# Patient Record
Sex: MALE | Race: WHITE | NOT HISPANIC OR LATINO | URBAN - NONMETROPOLITAN AREA
[De-identification: names, ages, dates, MRNs, and addresses within clinical notes are randomized per-mention and may not be internally consistent; named-entity substitution may affect disease eponyms.]

---

## 2019-12-16 ENCOUNTER — APPOINTMENT (OUTPATIENT)
Dept: GENERAL RADIOLOGY | Facility: HOSPITAL | Age: 69
End: 2019-12-16

## 2019-12-16 ENCOUNTER — HOSPITAL ENCOUNTER (INPATIENT)
Facility: HOSPITAL | Age: 69
LOS: 3 days | Discharge: HOME | End: 2019-12-19
Attending: SPECIALIST | Admitting: SPECIALIST

## 2019-12-16 DIAGNOSIS — K80.12 CALCULUS OF GALLBLADDER WITH ACUTE ON CHRONIC CHOLECYSTITIS WITHOUT OBSTRUCTION: Primary | ICD-10-CM

## 2019-12-16 DIAGNOSIS — K81.9 CHOLECYSTITIS: ICD-10-CM

## 2019-12-16 PROBLEM — K80.10 CHOLECYSTITIS WITH CHOLELITHIASIS: Status: ACTIVE | Noted: 2019-12-16

## 2019-12-16 LAB
CREAT BLD-MCNC: 1.14 MG/DL (ref 0.76–1.27)
GFR SERPL CREATININE-BSD FRML MDRD: 64 ML/MIN/1.73

## 2019-12-16 PROCEDURE — 82150 ASSAY OF AMYLASE: CPT | Performed by: SPECIALIST

## 2019-12-16 PROCEDURE — 71045 X-RAY EXAM CHEST 1 VIEW: CPT

## 2019-12-16 PROCEDURE — 82565 ASSAY OF CREATININE: CPT | Performed by: SPECIALIST

## 2019-12-16 PROCEDURE — 83690 ASSAY OF LIPASE: CPT | Performed by: SPECIALIST

## 2019-12-16 PROCEDURE — 25010000002 PIPERACILLIN SOD-TAZOBACTAM PER 1 G: Performed by: SPECIALIST

## 2019-12-16 RX ORDER — CARVEDILOL 25 MG/1
25 TABLET ORAL 2 TIMES DAILY WITH MEALS
Status: DISCONTINUED | OUTPATIENT
Start: 2019-12-16 | End: 2019-12-19 | Stop reason: HOSPADM

## 2019-12-16 RX ORDER — HYDROCODONE BITARTRATE AND ACETAMINOPHEN 7.5; 325 MG/1; MG/1
1 TABLET ORAL 2 TIMES DAILY
COMMUNITY

## 2019-12-16 RX ORDER — OXYCODONE AND ACETAMINOPHEN 10; 325 MG/1; MG/1
1 TABLET ORAL EVERY 4 HOURS PRN
Status: DISCONTINUED | OUTPATIENT
Start: 2019-12-16 | End: 2019-12-19 | Stop reason: HOSPADM

## 2019-12-16 RX ORDER — LISINOPRIL 10 MG/1
10 TABLET ORAL DAILY
COMMUNITY

## 2019-12-16 RX ORDER — CHOLECALCIFEROL (VITAMIN D3) 125 MCG
10 CAPSULE ORAL NIGHTLY PRN
COMMUNITY

## 2019-12-16 RX ORDER — HYDROCODONE BITARTRATE AND ACETAMINOPHEN 7.5; 325 MG/1; MG/1
1 TABLET ORAL EVERY 4 HOURS PRN
Status: DISCONTINUED | OUTPATIENT
Start: 2019-12-16 | End: 2019-12-19 | Stop reason: HOSPADM

## 2019-12-16 RX ORDER — MORPHINE SULFATE 2 MG/ML
2 INJECTION, SOLUTION INTRAMUSCULAR; INTRAVENOUS
Status: DISCONTINUED | OUTPATIENT
Start: 2019-12-16 | End: 2019-12-19 | Stop reason: HOSPADM

## 2019-12-16 RX ORDER — DIPHENHYDRAMINE HCL 50 MG
50 CAPSULE ORAL NIGHTLY PRN
COMMUNITY

## 2019-12-16 RX ORDER — PANTOPRAZOLE SODIUM 20 MG/1
20 TABLET, DELAYED RELEASE ORAL
COMMUNITY

## 2019-12-16 RX ORDER — FUROSEMIDE 20 MG/1
20 TABLET ORAL 2 TIMES DAILY
COMMUNITY

## 2019-12-16 RX ORDER — SODIUM CHLORIDE 0.9 % (FLUSH) 0.9 %
10 SYRINGE (ML) INJECTION EVERY 12 HOURS SCHEDULED
Status: DISCONTINUED | OUTPATIENT
Start: 2019-12-16 | End: 2019-12-19 | Stop reason: HOSPADM

## 2019-12-16 RX ORDER — LANOLIN ALCOHOL/MO/W.PET/CERES
1000 CREAM (GRAM) TOPICAL DAILY
COMMUNITY

## 2019-12-16 RX ORDER — NALOXONE HCL 0.4 MG/ML
0.4 VIAL (ML) INJECTION
Status: DISCONTINUED | OUTPATIENT
Start: 2019-12-16 | End: 2019-12-19 | Stop reason: HOSPADM

## 2019-12-16 RX ORDER — DEXTROSE, SODIUM CHLORIDE, AND POTASSIUM CHLORIDE 5; .45; .15 G/100ML; G/100ML; G/100ML
75 INJECTION INTRAVENOUS CONTINUOUS
Status: DISCONTINUED | OUTPATIENT
Start: 2019-12-16 | End: 2019-12-18

## 2019-12-16 RX ORDER — ALPRAZOLAM 0.5 MG/1
0.5 TABLET ORAL DAILY PRN
COMMUNITY

## 2019-12-16 RX ORDER — ASPIRIN 81 MG/1
81 TABLET, CHEWABLE ORAL DAILY
COMMUNITY

## 2019-12-16 RX ORDER — FERROUS SULFATE 325(65) MG
325 TABLET ORAL
COMMUNITY

## 2019-12-16 RX ORDER — CARVEDILOL 25 MG/1
25 TABLET ORAL 2 TIMES DAILY WITH MEALS
COMMUNITY

## 2019-12-16 RX ORDER — AMITRIPTYLINE HYDROCHLORIDE 25 MG/1
25 TABLET, FILM COATED ORAL NIGHTLY
Status: DISCONTINUED | OUTPATIENT
Start: 2019-12-16 | End: 2019-12-19 | Stop reason: HOSPADM

## 2019-12-16 RX ORDER — ALPRAZOLAM 0.5 MG/1
0.5 TABLET ORAL DAILY PRN
Status: DISCONTINUED | OUTPATIENT
Start: 2019-12-16 | End: 2019-12-19 | Stop reason: HOSPADM

## 2019-12-16 RX ORDER — LISINOPRIL 10 MG/1
10 TABLET ORAL DAILY
Status: DISCONTINUED | OUTPATIENT
Start: 2019-12-16 | End: 2019-12-19 | Stop reason: HOSPADM

## 2019-12-16 RX ORDER — AMITRIPTYLINE HYDROCHLORIDE 25 MG/1
25 TABLET, FILM COATED ORAL NIGHTLY
COMMUNITY

## 2019-12-16 RX ORDER — MELATONIN
1000 DAILY
COMMUNITY

## 2019-12-16 RX ORDER — ATORVASTATIN CALCIUM 20 MG/1
20 TABLET, FILM COATED ORAL NIGHTLY
COMMUNITY

## 2019-12-16 RX ORDER — ONDANSETRON 2 MG/ML
4 INJECTION INTRAMUSCULAR; INTRAVENOUS EVERY 6 HOURS PRN
Status: DISCONTINUED | OUTPATIENT
Start: 2019-12-16 | End: 2019-12-19 | Stop reason: HOSPADM

## 2019-12-16 RX ORDER — SODIUM CHLORIDE 0.9 % (FLUSH) 0.9 %
10 SYRINGE (ML) INJECTION AS NEEDED
Status: DISCONTINUED | OUTPATIENT
Start: 2019-12-16 | End: 2019-12-19 | Stop reason: HOSPADM

## 2019-12-16 RX ORDER — FAMOTIDINE 10 MG/ML
20 INJECTION, SOLUTION INTRAVENOUS 2 TIMES DAILY
Status: DISCONTINUED | OUTPATIENT
Start: 2019-12-16 | End: 2019-12-19 | Stop reason: HOSPADM

## 2019-12-16 RX ORDER — LANOLIN ALCOHOL/MO/W.PET/CERES
3 CREAM (GRAM) TOPICAL NIGHTLY PRN
Status: DISCONTINUED | OUTPATIENT
Start: 2019-12-16 | End: 2019-12-19 | Stop reason: HOSPADM

## 2019-12-16 RX ADMIN — FAMOTIDINE 20 MG: 10 INJECTION, SOLUTION INTRAVENOUS at 21:10

## 2019-12-16 RX ADMIN — CARVEDILOL 25 MG: 25 TABLET, FILM COATED ORAL at 20:55

## 2019-12-16 RX ADMIN — AMITRIPTYLINE HYDROCHLORIDE 25 MG: 25 TABLET, FILM COATED ORAL at 20:55

## 2019-12-16 RX ADMIN — LISINOPRIL 10 MG: 10 TABLET ORAL at 20:55

## 2019-12-16 RX ADMIN — TAZOBACTAM SODIUM AND PIPERACILLIN SODIUM 3.38 G: 375; 3 INJECTION, SOLUTION INTRAVENOUS at 20:56

## 2019-12-16 RX ADMIN — POTASSIUM CHLORIDE, DEXTROSE MONOHYDRATE AND SODIUM CHLORIDE 75 ML/HR: 150; 5; 450 INJECTION, SOLUTION INTRAVENOUS at 20:56

## 2019-12-17 ENCOUNTER — APPOINTMENT (OUTPATIENT)
Dept: CARDIOLOGY | Facility: HOSPITAL | Age: 69
End: 2019-12-17

## 2019-12-17 ENCOUNTER — ANESTHESIA (OUTPATIENT)
Dept: PERIOP | Facility: HOSPITAL | Age: 69
End: 2019-12-17

## 2019-12-17 ENCOUNTER — ANESTHESIA EVENT (OUTPATIENT)
Dept: PERIOP | Facility: HOSPITAL | Age: 69
End: 2019-12-17

## 2019-12-17 LAB
ALBUMIN SERPL-MCNC: 3.3 G/DL (ref 3.5–5.2)
ALBUMIN/GLOB SERPL: 1 G/DL
ALP SERPL-CCNC: 103 U/L (ref 39–117)
ALT SERPL W P-5'-P-CCNC: 14 U/L (ref 1–41)
AMYLASE SERPL-CCNC: 41 U/L (ref 28–100)
ANION GAP SERPL CALCULATED.3IONS-SCNC: 12 MMOL/L (ref 5–15)
AST SERPL-CCNC: 13 U/L (ref 1–40)
BASOPHILS # BLD AUTO: 0.05 10*3/MM3 (ref 0–0.2)
BASOPHILS NFR BLD AUTO: 0.5 % (ref 0–1.5)
BH CV ECHO MEAS - AO MAX PG (FULL): 4.9 MMHG
BH CV ECHO MEAS - AO MAX PG: 12.5 MMHG
BH CV ECHO MEAS - AO MEAN PG (FULL): 3 MMHG
BH CV ECHO MEAS - AO MEAN PG: 6 MMHG
BH CV ECHO MEAS - AO ROOT AREA (BSA CORRECTED): 1.4
BH CV ECHO MEAS - AO ROOT AREA: 6.2 CM^2
BH CV ECHO MEAS - AO ROOT DIAM: 2.8 CM
BH CV ECHO MEAS - AO V2 MAX: 177 CM/SEC
BH CV ECHO MEAS - AO V2 MEAN: 114 CM/SEC
BH CV ECHO MEAS - AO V2 VTI: 37 CM
BH CV ECHO MEAS - AVA(I,A): 3.6 CM^2
BH CV ECHO MEAS - AVA(I,D): 3.6 CM^2
BH CV ECHO MEAS - AVA(V,A): 3.2 CM^2
BH CV ECHO MEAS - AVA(V,D): 3.2 CM^2
BH CV ECHO MEAS - BSA(HAYCOCK): 2 M^2
BH CV ECHO MEAS - BSA: 2 M^2
BH CV ECHO MEAS - BZI_BMI: 28.3 KILOGRAMS/M^2
BH CV ECHO MEAS - BZI_METRIC_HEIGHT: 172.7 CM
BH CV ECHO MEAS - BZI_METRIC_WEIGHT: 84.4 KG
BH CV ECHO MEAS - EDV(CUBED): 198.2 ML
BH CV ECHO MEAS - EDV(MOD-SP4): 224 ML
BH CV ECHO MEAS - EDV(TEICH): 168.5 ML
BH CV ECHO MEAS - EF(CUBED): 54.9 %
BH CV ECHO MEAS - EF(MOD-SP4): 43.3 %
BH CV ECHO MEAS - EF(TEICH): 46 %
BH CV ECHO MEAS - ESV(CUBED): 89.3 ML
BH CV ECHO MEAS - ESV(MOD-SP4): 127 ML
BH CV ECHO MEAS - ESV(TEICH): 91 ML
BH CV ECHO MEAS - FS: 23.3 %
BH CV ECHO MEAS - IVS/LVPW: 1
BH CV ECHO MEAS - IVSD: 1 CM
BH CV ECHO MEAS - LA DIMENSION: 4.3 CM
BH CV ECHO MEAS - LA/AO: 1.5
BH CV ECHO MEAS - LAT PEAK E' VEL: 7.5 CM/SEC
BH CV ECHO MEAS - LV DIASTOLIC VOL/BSA (35-75): 113 ML/M^2
BH CV ECHO MEAS - LV MASS(C)D: 241.3 GRAMS
BH CV ECHO MEAS - LV MASS(C)DI: 121.7 GRAMS/M^2
BH CV ECHO MEAS - LV MAX PG: 7.6 MMHG
BH CV ECHO MEAS - LV MEAN PG: 3 MMHG
BH CV ECHO MEAS - LV SYSTOLIC VOL/BSA (12-30): 64.1 ML/M^2
BH CV ECHO MEAS - LV V1 MAX: 138 CM/SEC
BH CV ECHO MEAS - LV V1 MEAN: 79.7 CM/SEC
BH CV ECHO MEAS - LV V1 VTI: 31.7 CM
BH CV ECHO MEAS - LVIDD: 5.8 CM
BH CV ECHO MEAS - LVIDS: 4.5 CM
BH CV ECHO MEAS - LVLD AP4: 9.3 CM
BH CV ECHO MEAS - LVLS AP4: 9 CM
BH CV ECHO MEAS - LVOT AREA (M): 4.2 CM^2
BH CV ECHO MEAS - LVOT AREA: 4.2 CM^2
BH CV ECHO MEAS - LVOT DIAM: 2.3 CM
BH CV ECHO MEAS - LVPWD: 1 CM
BH CV ECHO MEAS - MED PEAK E' VEL: 5.11 CM/SEC
BH CV ECHO MEAS - MR MAX PG: 29.4 MMHG
BH CV ECHO MEAS - MR MAX VEL: 271 CM/SEC
BH CV ECHO MEAS - MV A MAX VEL: 124 CM/SEC
BH CV ECHO MEAS - MV DEC TIME: 0.2 SEC
BH CV ECHO MEAS - MV E MAX VEL: 89.3 CM/SEC
BH CV ECHO MEAS - MV E/A: 0.72
BH CV ECHO MEAS - PA MAX PG: 5.6 MMHG
BH CV ECHO MEAS - PA V2 MAX: 118 CM/SEC
BH CV ECHO MEAS - RAP SYSTOLE: 5 MMHG
BH CV ECHO MEAS - RVDD: 4.3 CM
BH CV ECHO MEAS - RVSP: 33.9 MMHG
BH CV ECHO MEAS - SI(AO): 115 ML/M^2
BH CV ECHO MEAS - SI(CUBED): 54.9 ML/M^2
BH CV ECHO MEAS - SI(LVOT): 66.5 ML/M^2
BH CV ECHO MEAS - SI(MOD-SP4): 48.9 ML/M^2
BH CV ECHO MEAS - SI(TEICH): 39.1 ML/M^2
BH CV ECHO MEAS - SV(AO): 227.8 ML
BH CV ECHO MEAS - SV(CUBED): 108.8 ML
BH CV ECHO MEAS - SV(LVOT): 131.7 ML
BH CV ECHO MEAS - SV(MOD-SP4): 97 ML
BH CV ECHO MEAS - SV(TEICH): 77.5 ML
BH CV ECHO MEAS - TR MAX VEL: 269 CM/SEC
BH CV ECHO MEASUREMENTS AVERAGE E/E' RATIO: 14.16
BILIRUB SERPL-MCNC: 0.9 MG/DL (ref 0.2–1.2)
BUN BLD-MCNC: 23 MG/DL (ref 8–23)
BUN/CREAT SERPL: 18.5 (ref 7–25)
CALCIUM SPEC-SCNC: 9 MG/DL (ref 8.6–10.5)
CHLORIDE SERPL-SCNC: 96 MMOL/L (ref 98–107)
CO2 SERPL-SCNC: 31 MMOL/L (ref 22–29)
CREAT BLD-MCNC: 1.24 MG/DL (ref 0.76–1.27)
DEPRECATED RDW RBC AUTO: 39.9 FL (ref 37–54)
EOSINOPHIL # BLD AUTO: 0.17 10*3/MM3 (ref 0–0.4)
EOSINOPHIL NFR BLD AUTO: 1.8 % (ref 0.3–6.2)
ERYTHROCYTE [DISTWIDTH] IN BLOOD BY AUTOMATED COUNT: 11.9 % (ref 12.3–15.4)
GFR SERPL CREATININE-BSD FRML MDRD: 58 ML/MIN/1.73
GLOBULIN UR ELPH-MCNC: 3.3 GM/DL
GLUCOSE BLD-MCNC: 120 MG/DL (ref 65–99)
HCT VFR BLD AUTO: 33.4 % (ref 37.5–51)
HGB BLD-MCNC: 11.3 G/DL (ref 13–17.7)
IMM GRANULOCYTES # BLD AUTO: 0.04 10*3/MM3 (ref 0–0.05)
IMM GRANULOCYTES NFR BLD AUTO: 0.4 % (ref 0–0.5)
LEFT ATRIUM VOLUME INDEX: 41.5 ML/M2
LEFT ATRIUM VOLUME: 82.1 CM3
LIPASE SERPL-CCNC: 49 U/L (ref 13–60)
LV EF 2D ECHO EST: 45 %
LYMPHOCYTES # BLD AUTO: 1.67 10*3/MM3 (ref 0.7–3.1)
LYMPHOCYTES NFR BLD AUTO: 18.1 % (ref 19.6–45.3)
MAXIMAL PREDICTED HEART RATE: 150 BPM
MCH RBC QN AUTO: 30.9 PG (ref 26.6–33)
MCHC RBC AUTO-ENTMCNC: 33.8 G/DL (ref 31.5–35.7)
MCV RBC AUTO: 91.3 FL (ref 79–97)
MONOCYTES # BLD AUTO: 1.02 10*3/MM3 (ref 0.1–0.9)
MONOCYTES NFR BLD AUTO: 11 % (ref 5–12)
NEUTROPHILS # BLD AUTO: 6.29 10*3/MM3 (ref 1.7–7)
NEUTROPHILS NFR BLD AUTO: 68.2 % (ref 42.7–76)
NRBC BLD AUTO-RTO: 0 /100 WBC (ref 0–0.2)
PLATELET # BLD AUTO: 160 10*3/MM3 (ref 140–450)
PMV BLD AUTO: 12.5 FL (ref 6–12)
POTASSIUM BLD-SCNC: 3 MMOL/L (ref 3.5–5.2)
PROT SERPL-MCNC: 6.6 G/DL (ref 6–8.5)
RBC # BLD AUTO: 3.66 10*6/MM3 (ref 4.14–5.8)
SODIUM BLD-SCNC: 139 MMOL/L (ref 136–145)
STRESS TARGET HR: 128 BPM
WBC NRBC COR # BLD: 9.24 10*3/MM3 (ref 3.4–10.8)

## 2019-12-17 PROCEDURE — 25010000002 ONDANSETRON PER 1 MG: Performed by: NURSE ANESTHETIST, CERTIFIED REGISTERED

## 2019-12-17 PROCEDURE — 93306 TTE W/DOPPLER COMPLETE: CPT | Performed by: INTERNAL MEDICINE

## 2019-12-17 PROCEDURE — 25010000002 ONDANSETRON PER 1 MG: Performed by: ANESTHESIOLOGY

## 2019-12-17 PROCEDURE — 25010000002 IOPAMIDOL 61 % SOLUTION: Performed by: SPECIALIST

## 2019-12-17 PROCEDURE — 0399T HC MYOCARDL STRAIN IMAG QUAN ASSMT PER SESS: CPT

## 2019-12-17 PROCEDURE — 25010000003 CEFAZOLIN PER 500 MG: Performed by: NURSE ANESTHETIST, CERTIFIED REGISTERED

## 2019-12-17 PROCEDURE — 25010000002 MORPHINE PER 10 MG: Performed by: SPECIALIST

## 2019-12-17 PROCEDURE — 25010000002 NEOSTIGMINE 10 MG/10ML SOLUTION 10 ML VIAL: Performed by: NURSE ANESTHETIST, CERTIFIED REGISTERED

## 2019-12-17 PROCEDURE — 93306 TTE W/DOPPLER COMPLETE: CPT

## 2019-12-17 PROCEDURE — 99222 1ST HOSP IP/OBS MODERATE 55: CPT | Performed by: INTERNAL MEDICINE

## 2019-12-17 PROCEDURE — 85025 COMPLETE CBC W/AUTO DIFF WBC: CPT | Performed by: SPECIALIST

## 2019-12-17 PROCEDURE — 25010000002 DEXAMETHASONE PER 1 MG: Performed by: NURSE ANESTHETIST, CERTIFIED REGISTERED

## 2019-12-17 PROCEDURE — 0FT44ZZ RESECTION OF GALLBLADDER, PERCUTANEOUS ENDOSCOPIC APPROACH: ICD-10-PCS | Performed by: SPECIALIST

## 2019-12-17 PROCEDURE — 0399T ADULT TRANSTHORACIC ECHO COMPLETE W/ CONT IF NECESSARY PER PROTOCOL: CPT | Performed by: INTERNAL MEDICINE

## 2019-12-17 PROCEDURE — 25010000002 PROPOFOL 10 MG/ML EMULSION: Performed by: NURSE ANESTHETIST, CERTIFIED REGISTERED

## 2019-12-17 PROCEDURE — 25010000002 PERFLUTREN 6.52 MG/ML SUSPENSION: Performed by: SPECIALIST

## 2019-12-17 PROCEDURE — 25010000002 PIPERACILLIN SOD-TAZOBACTAM PER 1 G: Performed by: SPECIALIST

## 2019-12-17 PROCEDURE — 25010000002 FENTANYL CITRATE (PF) 250 MCG/5ML SOLUTION: Performed by: NURSE ANESTHETIST, CERTIFIED REGISTERED

## 2019-12-17 PROCEDURE — 25010000002 FENTANYL CITRATE (PF) 100 MCG/2ML SOLUTION: Performed by: ANESTHESIOLOGY

## 2019-12-17 PROCEDURE — 25010000002 SUCCINYLCHOLINE PER 20 MG: Performed by: NURSE ANESTHETIST, CERTIFIED REGISTERED

## 2019-12-17 PROCEDURE — 80053 COMPREHEN METABOLIC PANEL: CPT | Performed by: SPECIALIST

## 2019-12-17 PROCEDURE — 88304 TISSUE EXAM BY PATHOLOGIST: CPT | Performed by: SPECIALIST

## 2019-12-17 RX ORDER — MIDAZOLAM HYDROCHLORIDE 1 MG/ML
1 INJECTION INTRAMUSCULAR; INTRAVENOUS
Status: DISCONTINUED | OUTPATIENT
Start: 2019-12-17 | End: 2019-12-17 | Stop reason: HOSPADM

## 2019-12-17 RX ORDER — DEXAMETHASONE SODIUM PHOSPHATE 4 MG/ML
INJECTION, SOLUTION INTRA-ARTICULAR; INTRALESIONAL; INTRAMUSCULAR; INTRAVENOUS; SOFT TISSUE AS NEEDED
Status: DISCONTINUED | OUTPATIENT
Start: 2019-12-17 | End: 2019-12-17 | Stop reason: SURG

## 2019-12-17 RX ORDER — GLYCOPYRROLATE 0.2 MG/ML
INJECTION INTRAMUSCULAR; INTRAVENOUS AS NEEDED
Status: DISCONTINUED | OUTPATIENT
Start: 2019-12-17 | End: 2019-12-17 | Stop reason: SURG

## 2019-12-17 RX ORDER — SODIUM CHLORIDE 9 MG/ML
INJECTION, SOLUTION INTRAVENOUS AS NEEDED
Status: DISCONTINUED | OUTPATIENT
Start: 2019-12-17 | End: 2019-12-17 | Stop reason: HOSPADM

## 2019-12-17 RX ORDER — PROPOFOL 10 MG/ML
VIAL (ML) INTRAVENOUS AS NEEDED
Status: DISCONTINUED | OUTPATIENT
Start: 2019-12-17 | End: 2019-12-17 | Stop reason: SURG

## 2019-12-17 RX ORDER — SUCCINYLCHOLINE CHLORIDE 20 MG/ML
INJECTION INTRAMUSCULAR; INTRAVENOUS AS NEEDED
Status: DISCONTINUED | OUTPATIENT
Start: 2019-12-17 | End: 2019-12-17 | Stop reason: SURG

## 2019-12-17 RX ORDER — SODIUM CHLORIDE 0.9 % (FLUSH) 0.9 %
3-10 SYRINGE (ML) INJECTION AS NEEDED
Status: DISCONTINUED | OUTPATIENT
Start: 2019-12-17 | End: 2019-12-17 | Stop reason: HOSPADM

## 2019-12-17 RX ORDER — SODIUM CHLORIDE, SODIUM LACTATE, POTASSIUM CHLORIDE, CALCIUM CHLORIDE 600; 310; 30; 20 MG/100ML; MG/100ML; MG/100ML; MG/100ML
100 INJECTION, SOLUTION INTRAVENOUS CONTINUOUS
Status: DISCONTINUED | OUTPATIENT
Start: 2019-12-17 | End: 2019-12-19 | Stop reason: HOSPADM

## 2019-12-17 RX ORDER — OXYCODONE AND ACETAMINOPHEN 10; 325 MG/1; MG/1
1 TABLET ORAL ONCE AS NEEDED
Status: DISCONTINUED | OUTPATIENT
Start: 2019-12-17 | End: 2019-12-17 | Stop reason: HOSPADM

## 2019-12-17 RX ORDER — MIDAZOLAM HYDROCHLORIDE 1 MG/ML
2 INJECTION INTRAMUSCULAR; INTRAVENOUS
Status: DISCONTINUED | OUTPATIENT
Start: 2019-12-17 | End: 2019-12-17 | Stop reason: HOSPADM

## 2019-12-17 RX ORDER — SODIUM CHLORIDE 0.9 % (FLUSH) 0.9 %
3 SYRINGE (ML) INJECTION EVERY 12 HOURS SCHEDULED
Status: DISCONTINUED | OUTPATIENT
Start: 2019-12-17 | End: 2019-12-17 | Stop reason: HOSPADM

## 2019-12-17 RX ORDER — ONDANSETRON 2 MG/ML
INJECTION INTRAMUSCULAR; INTRAVENOUS AS NEEDED
Status: DISCONTINUED | OUTPATIENT
Start: 2019-12-17 | End: 2019-12-17 | Stop reason: SURG

## 2019-12-17 RX ORDER — FENTANYL CITRATE 50 UG/ML
INJECTION, SOLUTION INTRAMUSCULAR; INTRAVENOUS AS NEEDED
Status: DISCONTINUED | OUTPATIENT
Start: 2019-12-17 | End: 2019-12-17 | Stop reason: SURG

## 2019-12-17 RX ORDER — LABETALOL HYDROCHLORIDE 5 MG/ML
5 INJECTION, SOLUTION INTRAVENOUS
Status: DISCONTINUED | OUTPATIENT
Start: 2019-12-17 | End: 2019-12-17 | Stop reason: HOSPADM

## 2019-12-17 RX ORDER — METOCLOPRAMIDE HYDROCHLORIDE 5 MG/ML
5 INJECTION INTRAMUSCULAR; INTRAVENOUS
Status: DISCONTINUED | OUTPATIENT
Start: 2019-12-17 | End: 2019-12-17 | Stop reason: HOSPADM

## 2019-12-17 RX ORDER — CEFAZOLIN SODIUM 1 G/3ML
INJECTION, POWDER, FOR SOLUTION INTRAMUSCULAR; INTRAVENOUS AS NEEDED
Status: DISCONTINUED | OUTPATIENT
Start: 2019-12-17 | End: 2019-12-17 | Stop reason: SURG

## 2019-12-17 RX ORDER — IPRATROPIUM BROMIDE AND ALBUTEROL SULFATE 2.5; .5 MG/3ML; MG/3ML
3 SOLUTION RESPIRATORY (INHALATION) ONCE AS NEEDED
Status: DISCONTINUED | OUTPATIENT
Start: 2019-12-17 | End: 2019-12-17 | Stop reason: HOSPADM

## 2019-12-17 RX ORDER — NEOSTIGMINE METHYLSULFATE 1 MG/ML
INJECTION, SOLUTION INTRAVENOUS AS NEEDED
Status: DISCONTINUED | OUTPATIENT
Start: 2019-12-17 | End: 2019-12-17 | Stop reason: SURG

## 2019-12-17 RX ORDER — LIDOCAINE HYDROCHLORIDE 20 MG/ML
INJECTION, SOLUTION INFILTRATION; PERINEURAL AS NEEDED
Status: DISCONTINUED | OUTPATIENT
Start: 2019-12-17 | End: 2019-12-17 | Stop reason: SURG

## 2019-12-17 RX ORDER — BUPIVACAINE HYDROCHLORIDE AND EPINEPHRINE 5; 5 MG/ML; UG/ML
INJECTION, SOLUTION PERINEURAL AS NEEDED
Status: DISCONTINUED | OUTPATIENT
Start: 2019-12-17 | End: 2019-12-17 | Stop reason: HOSPADM

## 2019-12-17 RX ORDER — FENTANYL CITRATE 50 UG/ML
25 INJECTION, SOLUTION INTRAMUSCULAR; INTRAVENOUS AS NEEDED
Status: DISCONTINUED | OUTPATIENT
Start: 2019-12-17 | End: 2019-12-17 | Stop reason: HOSPADM

## 2019-12-17 RX ORDER — NALOXONE HCL 0.4 MG/ML
0.4 VIAL (ML) INJECTION AS NEEDED
Status: DISCONTINUED | OUTPATIENT
Start: 2019-12-17 | End: 2019-12-17 | Stop reason: HOSPADM

## 2019-12-17 RX ORDER — PHENYLEPHRINE HCL IN 0.9% NACL 0.8MG/10ML
SYRINGE (ML) INTRAVENOUS AS NEEDED
Status: DISCONTINUED | OUTPATIENT
Start: 2019-12-17 | End: 2019-12-17 | Stop reason: SURG

## 2019-12-17 RX ORDER — MAGNESIUM HYDROXIDE 1200 MG/15ML
LIQUID ORAL AS NEEDED
Status: DISCONTINUED | OUTPATIENT
Start: 2019-12-17 | End: 2019-12-17 | Stop reason: HOSPADM

## 2019-12-17 RX ORDER — OXYCODONE AND ACETAMINOPHEN 7.5; 325 MG/1; MG/1
2 TABLET ORAL EVERY 4 HOURS PRN
Status: DISCONTINUED | OUTPATIENT
Start: 2019-12-17 | End: 2019-12-17 | Stop reason: HOSPADM

## 2019-12-17 RX ORDER — ROCURONIUM BROMIDE 10 MG/ML
INJECTION, SOLUTION INTRAVENOUS AS NEEDED
Status: DISCONTINUED | OUTPATIENT
Start: 2019-12-17 | End: 2019-12-17 | Stop reason: SURG

## 2019-12-17 RX ORDER — ACETAMINOPHEN 500 MG
1000 TABLET ORAL ONCE
Status: DISCONTINUED | OUTPATIENT
Start: 2019-12-17 | End: 2019-12-17 | Stop reason: HOSPADM

## 2019-12-17 RX ORDER — ONDANSETRON 2 MG/ML
4 INJECTION INTRAMUSCULAR; INTRAVENOUS ONCE AS NEEDED
Status: COMPLETED | OUTPATIENT
Start: 2019-12-17 | End: 2019-12-17

## 2019-12-17 RX ADMIN — Medication 80 MCG: at 17:14

## 2019-12-17 RX ADMIN — AMITRIPTYLINE HYDROCHLORIDE 25 MG: 25 TABLET, FILM COATED ORAL at 20:13

## 2019-12-17 RX ADMIN — TAZOBACTAM SODIUM AND PIPERACILLIN SODIUM 3.38 G: 375; 3 INJECTION, SOLUTION INTRAVENOUS at 02:11

## 2019-12-17 RX ADMIN — CEFAZOLIN 2 G: 330 INJECTION, POWDER, FOR SOLUTION INTRAMUSCULAR; INTRAVENOUS at 17:07

## 2019-12-17 RX ADMIN — FENTANYL CITRATE 100 MCG: 50 INJECTION, SOLUTION INTRAMUSCULAR; INTRAVENOUS at 18:30

## 2019-12-17 RX ADMIN — CARVEDILOL 25 MG: 25 TABLET, FILM COATED ORAL at 20:11

## 2019-12-17 RX ADMIN — DEXAMETHASONE SODIUM PHOSPHATE 4 MG: 4 INJECTION, SOLUTION INTRAMUSCULAR; INTRAVENOUS at 17:36

## 2019-12-17 RX ADMIN — FAMOTIDINE 20 MG: 10 INJECTION, SOLUTION INTRAVENOUS at 08:35

## 2019-12-17 RX ADMIN — ONDANSETRON HYDROCHLORIDE 4 MG: 2 SOLUTION INTRAMUSCULAR; INTRAVENOUS at 17:36

## 2019-12-17 RX ADMIN — CARVEDILOL 25 MG: 25 TABLET, FILM COATED ORAL at 08:35

## 2019-12-17 RX ADMIN — SODIUM CHLORIDE, PRESERVATIVE FREE 10 ML: 5 INJECTION INTRAVENOUS at 08:36

## 2019-12-17 RX ADMIN — NEOSTIGMINE METHYLSULFATE 3 MG: 1 INJECTION INTRAVENOUS at 17:52

## 2019-12-17 RX ADMIN — TAZOBACTAM SODIUM AND PIPERACILLIN SODIUM 3.38 G: 375; 3 INJECTION, SOLUTION INTRAVENOUS at 11:00

## 2019-12-17 RX ADMIN — SODIUM CHLORIDE, POTASSIUM CHLORIDE, SODIUM LACTATE AND CALCIUM CHLORIDE 100 ML/HR: 600; 310; 30; 20 INJECTION, SOLUTION INTRAVENOUS at 16:43

## 2019-12-17 RX ADMIN — POTASSIUM CHLORIDE, DEXTROSE MONOHYDRATE AND SODIUM CHLORIDE 75 ML/HR: 150; 5; 450 INJECTION, SOLUTION INTRAVENOUS at 13:53

## 2019-12-17 RX ADMIN — SUCCINYLCHOLINE CHLORIDE 80 MG: 20 INJECTION, SOLUTION INTRAMUSCULAR; INTRAVENOUS at 16:51

## 2019-12-17 RX ADMIN — ROCURONIUM BROMIDE 5 MG: 10 INJECTION INTRAVENOUS at 16:51

## 2019-12-17 RX ADMIN — PERFLUTREN 8.48 MG: 6.52 INJECTION, SUSPENSION INTRAVENOUS at 13:20

## 2019-12-17 RX ADMIN — ROCURONIUM BROMIDE 25 MG: 10 INJECTION INTRAVENOUS at 17:03

## 2019-12-17 RX ADMIN — LISINOPRIL 10 MG: 10 TABLET ORAL at 08:35

## 2019-12-17 RX ADMIN — FAMOTIDINE 20 MG: 10 INJECTION, SOLUTION INTRAVENOUS at 20:32

## 2019-12-17 RX ADMIN — PROPOFOL 150 MG: 10 INJECTION, EMULSION INTRAVENOUS at 16:51

## 2019-12-17 RX ADMIN — SODIUM CHLORIDE, PRESERVATIVE FREE 10 ML: 5 INJECTION INTRAVENOUS at 20:38

## 2019-12-17 RX ADMIN — Medication 80 MCG: at 17:13

## 2019-12-17 RX ADMIN — GLYCOPYRROLATE 0.2 MG: 0.2 INJECTION, SOLUTION INTRAMUSCULAR; INTRAVENOUS at 17:52

## 2019-12-17 RX ADMIN — LIDOCAINE HYDROCHLORIDE 60 MG: 20 INJECTION, SOLUTION INFILTRATION; PERINEURAL at 16:51

## 2019-12-17 RX ADMIN — FENTANYL CITRATE 100 MCG: 50 INJECTION INTRAMUSCULAR; INTRAVENOUS at 16:51

## 2019-12-17 RX ADMIN — ONDANSETRON HYDROCHLORIDE 4 MG: 2 SOLUTION INTRAMUSCULAR; INTRAVENOUS at 18:30

## 2019-12-17 RX ADMIN — MORPHINE SULFATE 4 MG: 4 INJECTION, SOLUTION INTRAMUSCULAR; INTRAVENOUS at 20:31

## 2019-12-17 NOTE — PLAN OF CARE
Patient denies pain/nausea today. Cardiac clearance given and [place] to proceed with [facility]. Consent signed and in chart. IVF and abx cont. Patient has been NPO since MN. Will cont to monitor.      Problem: Pain, Chronic (Adult)  Goal: Identify Related Risk Factors and Signs and Symptoms  Description  Related risk factors and signs and symptoms are identified upon initiation of Human Response Clinical Practice Guideline (CPG).  Outcome: Ongoing (interventions implemented as appropriate)  Flowsheets (Taken 2019 0318 by Yana Dubose RN)  Related Risk Factors (Chronic Pain): disease process;knowledge deficit;prolonged healing;procedures/treatments;pain control inadequate;traumatic injury  Goal: Acceptable Pain/Comfort Level and Functional Ability  Description  Patient will demonstrate the desired outcomes by discharge/transition of care.  Outcome: Ongoing (interventions implemented as appropriate)     Problem: Patient Care Overview  Goal: Plan of Care Review  Outcome: Ongoing (interventions implemented as appropriate)  Flowsheets (Taken 2019 9686)  Progress: improving  Plan of Care Reviewed With: patient; spouse  Goal: Individualization and Mutuality  Outcome: Ongoing (interventions implemented as appropriate)  Goal: Discharge Needs Assessment  Outcome: Ongoing (interventions implemented as appropriate)  Goal: Interprofessional Rounds/Family Conf  Outcome: Ongoing (interventions implemented as appropriate)     Problem: Cholecystectomy (Adult)  Description  Prevent and manage potential problems includin. bleeding  2. decreased bowel motility  3. infection  4. pain  5. postoperative nausea and vomiting  6. postoperative urinary retention  7. respiratory compromise  8. situational response  9. VTE (venous thromboembolism)  10. wound healing impaired  Goal: Signs and Symptoms of Listed Potential Problems Will be Absent, Minimized or Managed (Cholecystectomy)  Description  Signs and symptoms of  listed potential problems will be absent, minimized or managed by discharge/transition of care (reference Cholecystectomy (Adult) CPG).  Outcome: Ongoing (interventions implemented as appropriate)  Goal: Anesthesia/Sedation Recovery  Outcome: Ongoing (interventions implemented as appropriate)

## 2019-12-17 NOTE — PLAN OF CARE
Problem: Pain, Chronic (Adult)  Goal: Identify Related Risk Factors and Signs and Symptoms  Outcome: Ongoing (interventions implemented as appropriate)  Flowsheets (Taken 12/17/2019 0318)  Related Risk Factors (Chronic Pain): disease process; knowledge deficit; prolonged healing; procedures/treatments; pain control inadequate; traumatic injury  Signs and Symptoms (Chronic Pain): verbalization of pain/discomfort for a prolonged time period; verbalization of pain descriptors     Problem: Patient Care Overview  Goal: Plan of Care Review  Outcome: Ongoing (interventions implemented as appropriate)  Flowsheets (Taken 12/17/2019 0318)  Progress: no change  Plan of Care Reviewed With: patient  Note:   Direct admit ;  from [facility]; 10 days of pain r/t gallstones and CT of abdomen;  4 days w/out eating and being pain free;  WBC = 13.5;  Hx of CABG in 2004, CAD, CKD;  medtronic defibrillator;

## 2019-12-17 NOTE — PAYOR COMM NOTE
"12/17/19  [facility] [phone]  [phone]    PATIENT SENT HERE BY ABDULLAHI EVANS.          Renetta Crawford (69 y.o. Male)     Date of Birth Social Security Number Address Home Phone MRN    1950  [address]  [place] [address] [phone] [id number]    Hinduism Marital Status          Other        Admission Date Admission Type Admitting Provider Attending Provider Department, Room/Bed    12/16/19 Urgent Karishma Ariza MD Tyrrell, Dana R, MD [facility] 3C, 378/1    Discharge Date Discharge Disposition Discharge Destination                       Attending Provider:  Karishma Ariza MD    Allergies:  No Known Allergies    Isolation:  None   Infection:  None   Code Status:  CPR    Ht:  172.7 cm (68\")   Wt:  84.4 kg (186 lb)    Admission Cmt:  None   Principal Problem:  None                Active Insurance as of 12/16/2019     Primary Coverage     Payor Plan Insurance Group Employer/Plan Group    [facility] [employer]MIN[id number]      Payor Plan Address Payor Plan Phone Number Payor Plan Fax Number Effective Dates    [address] [phone]  9/1/2014 - None Entered    [facility] [address]       Subscriber Name Subscriber Birth Date Member ID       RENETTA CRAWFORD 1950 [id number]                 Emergency Contacts      (Rel.) Home Phone Work Phone Mobile Phone    TOBIASMICHELJUAN JOSE (Spouse) [phone] -- --    MOO NIHARIKA (Relative) [phone] -- --        Karishma Ariza MD   Physician   Surgery   H&P   Signed   Date of Service:  12/16/19 1946   Creation Time:  12/16/19 1946            Signed        Expand All Collapse All      Show:Clear all  [x]Manual[x]Template[]Copied    Added by:  [x]Karishma Ariza MD    []Crystal for details    Karishma Ariza MD  H&P     Patient Care Team:  Lauryn Goodwin APRN as PCP - General (Nurse Practitioner)     Chief complaint abdominal pain        Subjective         Renetta " Yvette  is a 69 y.o. male transferred from [facility] with abdominal pain.  Patient states on the fifth of this month he had country ham for lunch and had severe abdominal pain develop.  He has had intermittent pain since that time.  Essentially every time he eats now is getting pain that starts at his umbilicus and radiates around his backside.  Is had no nausea no vomiting.  Has had no fevers.  He has had some diarrhea today and yesterday for the first time during this episode.  [facility] and then [facility] which showed gallstones with mild thickening of the gallbladder wall but normal ductal system per [facility].  The physician there also stated that he has cardiomyopathy and history of CHF.  Patient states that he is not aware of those diagnoses.  He has had bypass in 2004 and defibrillator pacemaker with revision a couple years ago.  He also states that he has had a right nephrectomy and other abdominal surgery but is not sure exactly what type.     Review of Systems              Pertinent items are noted in HPI, all other systems reviewed and negative     History  Medical History   No past medical history on file.     Surgical History         Past Surgical History:   Procedure Laterality Date   • CARDIAC CATHETERIZATION       • CARDIAC SURGERY                         No family history on file.  Social History            Tobacco Use   • Smoking status: Never Smoker   • Smokeless tobacco: Never Used   Substance Use Topics   • Alcohol use: Yes       Alcohol/week: 42.0 standard drinks       Types: 42 Cans of beer per week       Comment: 6 PACK A DAY   • Drug use: Not on file      Prescriptions Prior to Admission           Medications Prior to Admission   Medication Sig Dispense Refill Last Dose   • ALPRAZolam (XANAX) 0.5 MG tablet Take 0.5 mg by mouth Daily As Needed for Anxiety.         • amitriptyline (ELAVIL) 25 MG tablet Take 25 mg by mouth Every Night.         • aspirin  81 MG chewable tablet Chew 81 mg Daily.         • atorvastatin (LIPITOR) 20 MG tablet Take 20 mg by mouth Every Night.         • carvedilol (COREG) 25 MG tablet Take 25 mg by mouth 2 (Two) Times a Day With Meals.         • cholecalciferol (VITAMIN D3) 25 MCG (1000 UT) tablet Take 1,000 Units by mouth Daily.         • diphenhydrAMINE (BENADRYL) 50 MG capsule Take 50 mg by mouth At Night As Needed for Itching or Sleep.         • ferrous sulfate 325 (65 FE) MG tablet Take 325 mg by mouth Daily With Breakfast.         • furosemide (LASIX) 20 MG tablet Take 20 mg by mouth 2 (Two) Times a Day. [facility]         • HYDROcodone-acetaminophen (NORCO) 7.5-325 MG per tablet Take 1 tablet by mouth 2 (Two) Times a Day.         • lisinopril (PRINIVIL,ZESTRIL) 10 MG tablet Take 10 mg by mouth Daily.         • melatonin 5 MG tablet tablet Take 10 mg by mouth At Night As Needed.         • pantoprazole (PROTONIX) 20 MG EC tablet Take 20 mg by mouth 2 (Two) Times a Day Before Meals.         • vitamin B-12 (CYANOCOBALAMIN) 1000 MCG tablet Take 1,000 mcg by mouth Daily.               Allergies:  Patient has no known allergies.        Objective         Vital Signs  Temp:  [98.3 °F (36.8 °C)] 98.3 °F (36.8 °C)  Heart Rate:  [55] 55  Resp:  [16] 16  BP: (157)/(94) 157/94     Physical Exam:                 General Appearance:    Alert, cooperative, in no acute distress   Head:    Normocephalic, without obvious abnormality, atraumatic   Eyes:            Lids and lashes normal, conjunctivae and sclerae normal, no   icterus, no pallor, corneas clear, PERRLA   Ears:    Ears appear intact with no abnormalities noted   Neck:   No adenopathy, supple, trachea midline   Back:     No kyphosis present, no scoliosis present, no skin lesions,      erythema or scars, no tenderness to percussion or                   palpation,   range of motion normal   Lungs:     Clear to auscultation,respirations regular, even and                   unlabored    Heart:    Regular rhythm and normal rate, normal S1 and S2, no            murmur, no gallop, no rub, no click   Chest Wall:    No abnormalities observed   Abdomen:    Distended mildly tender right upper quadrant tympanitic in the epigastrium and upper abdomen.  He has a right flank incision as well as a upper midline incision extending below the umbilicus.  He has a small scab in his midline incision that may be related to a suture granuloma   Rectal:     Deferred   Extremities:   Moves all extremities well, no edema, no cyanosis, no             redness   Pulses:   Pulses palpable and equal bilaterally   Skin:   No bleeding, bruising or rash   Lymph nodes:   No palpable adenopathy   Neurologic:   No focal deficits         Results Review:                     Lab Results (last 72 hours)      ** No results found for the last 72 hours. **              Imaging Results (Last 72 Hours)      ** No results found for the last 72 hours. **           Assessment/Plan           * No active hospital problems. *        Inguinal admit and let him have clear liquids tonight make him n.p.o. after midnight.  We will start him on some antibiotics.  We will need cardiac clearance before embarking on any surgery.        Karishma Ariza MD  12/16/19  7:46      Assessment/Plan      Errol Polo MD   Physician   Cardiology   Consults   Signed   Date of Service:  12/17/19 0830   Creation Time:  12/17/19 0830         Consult Orders   Inpatient Cardiology Consult [[id number]] ordered by Karishma Ariza MD at 12/16/19 1955          Signed        Expand All Collapse All      Show:Clear all  [x]Manual[x]Template[]Copied    Added by:  [x]Errol Polo MD    [][facility] for details      LOS: 1 day   Patient Care Team:  Lauryn Goodwin APRN as PCP - General (Nurse Practitioner)     Chief Complaint:   Cholecystitis with cholelithiasis           Subjective     Guy Crawford is a 69 y.o. malewho is being seen in consultation.  Patient has  ischemic cardiomyopathy  Prior history of myocardial infarction  Has undergone ICD implantation with battery replacement approximately 2 years ago  [facility]  Has seen Dr. Mccarthy in the past  Does not have any excessive shortness of breath  No chest pain  No palpitation  No ICD discharge  Presented with abdominal pain  Currently has ongoing abdominal discomfort  Plans for preoperative clearance for abdominal surgery  Orthopnea paroxysmal nocturnal dyspnea  No significant pedal edema  No palpitations     Telemetry: Not on telemetry                  HENT: Negative.    Eyes: Negative.       Respiratory: Negative for cough,   No chest wall soreness,   Shortness of breath,   no wheezing, no stridor.       Cardiovascular: As above     Gastrointestinal: Abdominal pain  Endocrine: Negative.    Genitourinary: Negative for difficulty urinating, dysuria, flank pain and hematuria.      Musculoskeletal: Negative.    Skin: Negative for rash and wound.   Allergic/Immunologic: Negative.       Neurological: Negative for dizziness, syncope, weakness,   No light-headedness  No  headaches.      Hematological: Does not bruise/bleed easily.      Psychiatric/Behavioral: Negative for agitation or behavioral problems,   No confusion,   the patient is  nervous/anxious.        WBC WBC   Date Value Ref Range Status   12/17/2019 9.24 3.40 - 10.80 10*3/mm3 Final       HGB       Hemoglobin   Date Value Ref Range Status   12/17/2019 11.3 (L) 13.0 - 17.7 g/dL Final       HCT       Hematocrit   Date Value Ref Range Status   12/17/2019 33.4 (L) 37.5 - 51.0 % Final       Platelets       Platelets   Date Value Ref Range Status   12/17/2019 160 140 - 450 10*3/mm3 Final       MCV MCV   Date Value Ref Range Status   12/17/2019 91.3 79.0 - 97.0 fL Final             Lab Units 12/17/19  0434 12/16/19 2016   SODIUM mmol/L 139  --    POTASSIUM mmol/L 3.0*  --    CHLORIDE mmol/L 96*  --    CO2 mmol/L 31.0*  --    BUN mg/dL 23  --    CREATININE mg/dL  1.24 1.14   CALCIUM mg/dL 9.0  --    BILIRUBIN mg/dL 0.9  --    ALK PHOS U/L 103  --    ALT (SGPT) U/L 14  --    AST (SGOT) U/L 13  --    GLUCOSE mg/dL 120*  --    No results found for: CKTOTAL, CKMB,   Objective         No Known Allergies     Medication Review: Performed  Current Medications             Current Facility-Administered Medications   Medication Dose Route Frequency Provider Last Rate Last Dose   • ALPRAZolam (XANAX) tablet 0.5 mg  0.5 mg Oral Daily PRN Karishma Ariza MD       • amitriptyline (ELAVIL) tablet 25 mg  25 mg Oral Nightly Karishma Ariza MD   25 mg at 12/16/19 2055   • carvedilol (COREG) tablet 25 mg  25 mg Oral BID With Meals Karishma Ariza MD   25 mg at 12/16/19 2055   • dextrose 5 % and sodium chloride 0.45 % with KCl 20 mEq/L infusion  75 mL/hr Intravenous Continuous Karishma Ariza MD 75 mL/hr at 12/16/19 2056 75 mL/hr at 12/16/19 2056   • famotidine (PEPCID) injection 20 mg  20 mg Intravenous BID Karishma Ariza MD   20 mg at 12/16/19 2110   • HYDROcodone-acetaminophen (NORCO) 7.5-325 MG per tablet 1 tablet  1 tablet Oral Q4H PRN Karishma Ariza MD       • lisinopril (PRINIVIL,ZESTRIL) tablet 10 mg  10 mg Oral Daily Karishma Ariza MD   10 mg at 12/16/19 2055   • melatonin tablet 3 mg  3 mg Oral Nightly PRN Karishma Ariza MD       • morphine injection 4 mg  4 mg Intravenous Q3H PRN Karishma Ariza MD         And   • naloxone (NARCAN) injection 0.4 mg  0.4 mg Intravenous Q5 Min PRN Karishma Ariza MD       • Morphine sulfate (PF) injection 2 mg  2 mg Intravenous Q3H PRN Karishma Ariza MD         And   • naloxone (NARCAN) injection 0.4 mg  0.4 mg Intravenous Q5 Min PRN Karishma Ariza MD       • ondansetron (ZOFRAN) injection 4 mg  4 mg Intravenous Q6H PRN Karishma Ariza MD       • oxyCODONE-acetaminophen (PERCOCET)  MG per tablet 1 tablet  1 tablet Oral Q4H PRN Karishma Ariza MD       • piperacillin-tazobactam (ZOSYN) 3.375 g in iso-osmotic dextrose 50 ml  "(premix)  3.375 g Intravenous Q8H Karishma Ariza MD   3.375 g at 12/17/19 0211   • sodium chloride 0.9 % flush 10 mL  10 mL Intravenous Q12H Karishma Ariza MD       • sodium chloride 0.9 % flush 10 mL  10 mL Intravenous PRN Karishma Ariza MD                Vital Sign Min/Max for last 24 hours  Temp  Min: 97.2 °F (36.2 °C)  Max: 98.3 °F (36.8 °C)   BP  Min: 129/89  Max: 157/94   Pulse  Min: 55  Max: 69   Resp  Min: 16  Max: 16   SpO2  Min: 94 %  Max: 99 %   No data recorded   Weight  Min: 84.4 kg (186 lb)  Max: 84.4 kg (186 lb)          Flowsheet Rows      First Filed Value   Admission Height  172.7 cm (68\") Documented at 12/16/2019 1640   Admission Weight  84.4 kg (186 lb) Documented at 12/16/2019 1640                Physical Exam:     General Appearance: Awake, alert, in no acute distress  Eyes: Pupils equal and reactive    Ears: Appear intact with no abnormalities noted  Nose: Nares normal, no drainage  Neck: supple, trachea midline, no carotid bruit and no JVD  Back: no kyphosis present,    Lungs: respirations regular, respirations even and respirations unlabored     Heart: normal S1, S2,  2/6 pansystolic murmur in the left sternal border,  no rub and no click     Abdomen: normal bowel sounds, no tenderness   Skin: no bleeding, bruising or rash  Extremities: no cyanosis  Psychiatric/Behavioral: Negative for agitation, behavioral problems, confusion, the patient does  appear to be nervous/anxious.                      Results Review:   I reviewed the patient's new clinical results.  I reviewed the patient's new imaging results and agree with the interpretation.  I reviewed the patient's other test results and agree with the interpretation  I personally viewed and interpreted the patient's EKG/Telemetry data  Discussed with patient     Reviewed available prior notes, consults, prior visits, laboratory findings, radiology and cardiology relevant reports.   Updated chart as applicable.   I have reviewed the " patient's medical history in detail and updated the computerized patient record as relevant.       Updated patient regarding any new or relevant abnormalities on review of records or any new findings on physical exam.   Mentioned to patient about purpose of visit and desirable health short and long term goals and objectives.         Assessment/Plan           Cholecystitis with cholelithiasis  Cardiomyopathy  Congestive heart failure  Prior ICD placement        Plan     Place patient on telemetry  Stat echocardiogram  Further recommendations pending results of echocardiogram regarding preoperative cardiac clearance  Check ICD and deactivated for surgery as required     Deep vein thrombosis prophylaxis/precautions  Appropriate diet, fluid, sodium, caffeine, stimulants intake   Questions were encouraged, asked and answered to the patient's  understanding and satisfaction.  Compliance to diet and medications         Errol Polo MD  12/17/19  8:31 AM     EMR Dragon/Transcription was used to dictate part of this note

## 2019-12-17 NOTE — OP NOTE
Karishma Ariza MD Operative Note    Guy Crawford  12/17/2019    Pre-op Diagnosis:   * No pre-op diagnosis entered *    Post-op Diagnosis:     same    Procedure/CPT® Codes:      Procedure(s):  CHOLECYSTECTOMY, LAPAROSCOPIC    Surgeon(s):  Karishma Ariza MD    Anesthesia: General    Staff:   Circulator: Martha Ramírez RN  Scrub Person: Meredith Toledo  Assistant: Nelly Bowles    Estimated Blood Loss: 200ml    Specimens:                Specimens     ID Source Type Tests Collected By Collected At Frozen?      A Gallbladder Tissue · TISSUE PATHOLOGY EXAM   Karishma Ariza MD 12/17/19 1739 No     Description: Gallbladder and Contents            Drains:   Closed/Suction Drain 1 Lateral;Right Abdomen Bulb 15 Fr. (Active)       Indications: Acute cholecystitis.  Cleared from cardiology    Findings: Severe acute cholecystitis with markedly edematous gallbladder wall and Calot's triangle    Complications: none    Procedure: The patient was brought to the operating room and placed in the supine position.  After induction of general anesthesia and infusion of IV antibiotics, the patient was prepped and draped in the usual sterile fashion.  Due to his prior upper midline incision and right flank incision a infraumbilical 5 mm incision was made and the abdomen was insufflated to pressure 15 mmHg.  5 mm port was placed.  The remaining 3 ports were placed under direct vision.  There were some adhesions from his prior upper midline but none from his kidney surgery.  The gallbladder was very distended and very edematous.  When grasped it ruptured and there was pus noted.  It was grasped just superiorly the infundibulum was covered in inflammatory adhesions which were very edematous and somewhat fibrotic.  This was freed.  The cystic duct and the cystic artery were identified isolated and divided tween clips.  I was planning on doing a cholangiogram due to the elevated bilirubin but due to the severe edema and difficulty in the  dissection with a very short cystic duct I felt that I might lose control of the duct and that that would be very difficult to complete laparoscopically so opted to just divide between clips.  The stump did not hold the clips well and I ultimately had to control it below these clips with an Endoloop.  We then dissected the gallbladder off the liver bed with cautery.  It was very raw surface and we controlled as best we could with combination of cautery and Surgicel.  A drain was placed into the liver bed gallbladder fossa and suture placed with 2-0 silk.  The gallbladder is placed into a bag removed the epigastric port.  Irrigation x-rays done.  All ports removed.  The fascia the time of the port sites closed with Vicryl.  Skin was approximate 5 except ago.  Absent Marcaine injected for local anesthesia.  I also excised a little suture granuloma from his midline incision where there was a scab.  This also was closed with Vicryl.  Patient awakened transferred to the cover him in stable condition tolerated the procedure well.  At the end of the procedure all counts were correct.    Karishma Ariza MD     Date: 12/17/2019  Time: 5:59 PM

## 2019-12-17 NOTE — CONSULTS
LOS: 1 day   Patient Care Team:  Lauryn Goodwin APRN as PCP - General (Nurse Practitioner)    Chief Complaint:   Cholecystitis with cholelithiasis         Subjective    Guy Crawford is a 69 y.o. malewho is being seen in consultation.  Patient has ischemic cardiomyopathy  Prior history of myocardial infarction  Has undergone ICD implantation with battery replacement approximately 2 years ago  [facility]  Has seen Dr. Mccarthy in the past  Does not have any excessive shortness of breath  No chest pain  No palpitation  No ICD discharge  Presented with abdominal pain  Currently has ongoing abdominal discomfort  Plans for preoperative clearance for abdominal surgery  Orthopnea paroxysmal nocturnal dyspnea  No significant pedal edema  No palpitations    Telemetry: Not on telemetry    Review of Systems     Constitutional: No chills   Has fatigue   No fever.     HENT: Negative.    Eyes: Negative.      Respiratory: Negative for cough,   No chest wall soreness,   Shortness of breath,   no wheezing, no stridor.      Cardiovascular: As above    Gastrointestinal: Abdominal pain  Endocrine: Negative.    Genitourinary: Negative for difficulty urinating, dysuria, flank pain and hematuria.     Musculoskeletal: Negative.    Skin: Negative for rash and wound.   Allergic/Immunologic: Negative.      Neurological: Negative for dizziness, syncope, weakness,   No light-headedness  No  headaches.     Hematological: Does not bruise/bleed easily.     Psychiatric/Behavioral: Negative for agitation or behavioral problems,   No confusion,   the patient is  nervous/anxious.       History:   History reviewed. No pertinent past medical history.  Past Surgical History:   Procedure Laterality Date   • CARDIAC CATHETERIZATION     • CARDIAC SURGERY     • KIDNEY SURGERY       Social History     Socioeconomic History   • Marital status:      Spouse name: Not on file   • Number of children: Not on file   • Years of education: Not on file    • Highest education level: Not on file   Tobacco Use   • Smoking status: Former Smoker     Packs/day: 1.00     Years: 40.00     Pack years: 40.00     Last attempt to quit: 2011     Years since quittin.0   • Smokeless tobacco: Never Used   Substance and Sexual Activity   • Drug use: Never   • Sexual activity: Defer     History reviewed. No pertinent family history.    Labs:  WBC WBC   Date Value Ref Range Status   2019 9.24 3.40 - 10.80 10*3/mm3 Final      HGB Hemoglobin   Date Value Ref Range Status   2019 11.3 (L) 13.0 - 17.7 g/dL Final      HCT Hematocrit   Date Value Ref Range Status   2019 33.4 (L) 37.5 - 51.0 % Final      Platelets Platelets   Date Value Ref Range Status   2019 160 140 - 450 10*3/mm3 Final      MCV MCV   Date Value Ref Range Status   2019 91.3 79.0 - 97.0 fL Final        Results from last 7 days   Lab Units 19  0434 19   SODIUM mmol/L 139  --    POTASSIUM mmol/L 3.0*  --    CHLORIDE mmol/L 96*  --    CO2 mmol/L 31.0*  --    BUN mg/dL 23  --    CREATININE mg/dL 1.24 1.14   CALCIUM mg/dL 9.0  --    BILIRUBIN mg/dL 0.9  --    ALK PHOS U/L 103  --    ALT (SGPT) U/L 14  --    AST (SGOT) U/L 13  --    GLUCOSE mg/dL 120*  --    No results found for: CKTOTAL, CKMB, CKMBINDEX, TROPONINI, TROPONINT  PT/INR:  No results found for: PROTIME/No results found for: INR    Imaging Results (Last 72 Hours)     Procedure Component Value Units Date/Time    XR Chest 1 View [[id number]] Collected:  19     Updated:  19    Narrative:       XR CHEST 1 VW- 2019 8:11 PM CST     HISTORY: History of congestive failure       COMPARISON: 2014.     FINDINGS:   Lung volumes are decreased. An AICD lead is present in the RIGHT  ventricle. Changes of previous sternotomy are seen. The  cardiomediastinal silhouette and pulmonary vascularity are unchanged.      The osseous structures and surrounding soft tissues demonstrate no  acute  abnormality.       Impression:       1. No radiographic evidence of acute cardiopulmonary process.        This report was finalized on 12/16/2019 20:24 by Dr. Cj Hopper MD.          Objective     No Known Allergies    Medication Review: Performed  Current Facility-Administered Medications   Medication Dose Route Frequency Provider Last Rate Last Dose   • ALPRAZolam (XANAX) tablet 0.5 mg  0.5 mg Oral Daily PRN Karishma Ariza MD       • amitriptyline (ELAVIL) tablet 25 mg  25 mg Oral Nightly Karishma Ariza MD   25 mg at 12/16/19 2055   • carvedilol (COREG) tablet 25 mg  25 mg Oral BID With Meals Karishma Ariza MD   25 mg at 12/16/19 2055   • dextrose 5 % and sodium chloride 0.45 % with KCl 20 mEq/L infusion  75 mL/hr Intravenous Continuous Karishma Ariza MD 75 mL/hr at 12/16/19 2056 75 mL/hr at 12/16/19 2056   • famotidine (PEPCID) injection 20 mg  20 mg Intravenous BID Karishma Ariza MD   20 mg at 12/16/19 2110   • HYDROcodone-acetaminophen (NORCO) 7.5-325 MG per tablet 1 tablet  1 tablet Oral Q4H PRN Karishma Ariza MD       • lisinopril (PRINIVIL,ZESTRIL) tablet 10 mg  10 mg Oral Daily Karishma Ariza MD   10 mg at 12/16/19 2055   • melatonin tablet 3 mg  3 mg Oral Nightly PRN Karishma Ariza MD       • morphine injection 4 mg  4 mg Intravenous Q3H PRN Karishma Ariza MD        And   • naloxone (NARCAN) injection 0.4 mg  0.4 mg Intravenous Q5 Min PRN Karishma Ariza MD       • Morphine sulfate (PF) injection 2 mg  2 mg Intravenous Q3H PRN Karishma Ariza MD        And   • naloxone (NARCAN) injection 0.4 mg  0.4 mg Intravenous Q5 Min PRN Karishma Ariza MD       • ondansetron (ZOFRAN) injection 4 mg  4 mg Intravenous Q6H PRN Karishma Ariza MD       • oxyCODONE-acetaminophen (PERCOCET)  MG per tablet 1 tablet  1 tablet Oral Q4H PRN Karishma Ariza MD       • piperacillin-tazobactam (ZOSYN) 3.375 g in iso-osmotic dextrose 50 ml (premix)  3.375 g Intravenous Q8H Karishma Ariza MD  "  3.375 g at 12/17/19 0211   • sodium chloride 0.9 % flush 10 mL  10 mL Intravenous Q12H Karishma Ariza MD       • sodium chloride 0.9 % flush 10 mL  10 mL Intravenous PRN Karishma Ariza MD           Vital Sign Min/Max for last 24 hours  Temp  Min: 97.2 °F (36.2 °C)  Max: 98.3 °F (36.8 °C)   BP  Min: 129/89  Max: 157/94   Pulse  Min: 55  Max: 69   Resp  Min: 16  Max: 16   SpO2  Min: 94 %  Max: 99 %   No data recorded   Weight  Min: 84.4 kg (186 lb)  Max: 84.4 kg (186 lb)     Flowsheet Rows      First Filed Value   Admission Height  172.7 cm (68\") Documented at 12/16/2019 1640   Admission Weight  84.4 kg (186 lb) Documented at 12/16/2019 1640               Physical Exam:    General Appearance: Awake, alert, in no acute distress  Eyes: Pupils equal and reactive    Ears: Appear intact with no abnormalities noted  Nose: Nares normal, no drainage  Neck: supple, trachea midline, no carotid bruit and no JVD  Back: no kyphosis present,    Lungs: respirations regular, respirations even and respirations unlabored    Heart: normal S1, S2,  2/6 pansystolic murmur in the left sternal border,  no rub and no click    Abdomen: normal bowel sounds, no tenderness   Skin: no bleeding, bruising or rash  Extremities: no cyanosis  Psychiatric/Behavioral: Negative for agitation, behavioral problems, confusion, the patient does  appear to be nervous/anxious.          Results Review:   I reviewed the patient's new clinical results.  I reviewed the patient's new imaging results and agree with the interpretation.  I reviewed the patient's other test results and agree with the interpretation  I personally viewed and interpreted the patient's EKG/Telemetry data  Discussed with patient    Reviewed available prior notes, consults, prior visits, laboratory findings, radiology and cardiology relevant reports.   Updated chart as applicable.   I have reviewed the patient's medical history in detail and updated the computerized patient record as " relevant.      Updated patient regarding any new or relevant abnormalities on review of records or any new findings on physical exam.   Mentioned to patient about purpose of visit and desirable health short and long term goals and objectives.     Assessment/Plan       Cholecystitis with cholelithiasis  Cardiomyopathy  Congestive heart failure  Prior ICD placement      Plan    Place patient on telemetry  Stat echocardiogram  Further recommendations pending results of echocardiogram regarding preoperative cardiac clearance  Check ICD and deactivated for surgery as required    Deep vein thrombosis prophylaxis/precautions  Appropriate diet, fluid, sodium, caffeine, stimulants intake   Questions were encouraged, asked and answered to the patient's  understanding and satisfaction.  Compliance to diet and medications       Errol Polo MD  12/17/19  8:31 AM    EMR Dragon/Transcription was used to dictate part of this note

## 2019-12-17 NOTE — PROGRESS NOTES
Echocardiogram shows ejection fraction 45%  [employer] has checked ICD and satisfactory parameters identified  Recommend using magnet during surgery  Acceptable cardiovascular risk of surgery  Monitor for signs of volume overload  Keep patient on telemetry  Discussed earlier with Dr. Karishma Todd

## 2019-12-17 NOTE — H&P
Karishma Ariza MD  H&P    Patient Care Team:  Lauryn Goodwin APRN as PCP - General (Nurse Practitioner)    Chief complaint abdominal pain    Subjective     Guy Crawford  is a 69 y.o. male transferred from [facility] with abdominal pain.  Patient states on the fifth of this month he had country ham for lunch and had severe abdominal pain develop.  He has had intermittent pain since that time.  Essentially every time he eats now is getting pain that starts at his umbilicus and radiates around his backside.  Is had no nausea no vomiting.  Has had no fevers.  He has had some diarrhea today and yesterday for the first time during this episode.  [facility] and then ultrasound which showed gallstones with mild thickening of the gallbladder wall but normal ductal system per the [facility] physician.  The physician there also stated that he has cardiomyopathy and history of CHF.  Patient states that he is not aware of those diagnoses.  He has had bypass in 2004 and defibrillator pacemaker with revision a couple years ago.  He also states that he has had a right nephrectomy and other abdominal surgery but is not sure exactly what type.    Review of Systems   Pertinent items are noted in HPI, all other systems reviewed and negative    History  No past medical history on file.  Past Surgical History:   Procedure Laterality Date   • CARDIAC CATHETERIZATION     • CARDIAC SURGERY       No family history on file.  Social History     Tobacco Use   • Smoking status: Never Smoker   • Smokeless tobacco: Never Used   Substance Use Topics   • Alcohol use: Yes     Alcohol/week: 42.0 standard drinks     Types: 42 Cans of beer per week     Comment: 6 PACK A DAY   • Drug use: Not on file     Medications Prior to Admission   Medication Sig Dispense Refill Last Dose   • ALPRAZolam (XANAX) 0.5 MG tablet Take 0.5 mg by mouth Daily As Needed for Anxiety.      • amitriptyline (ELAVIL) 25 MG tablet Take 25 mg by mouth Every  Night.      • aspirin 81 MG chewable tablet Chew 81 mg Daily.      • atorvastatin (LIPITOR) 20 MG tablet Take 20 mg by mouth Every Night.      • carvedilol (COREG) 25 MG tablet Take 25 mg by mouth 2 (Two) Times a Day With Meals.      • cholecalciferol (VITAMIN D3) 25 MCG (1000 UT) tablet Take 1,000 Units by mouth Daily.      • diphenhydrAMINE (BENADRYL) 50 MG capsule Take 50 mg by mouth At Night As Needed for Itching or Sleep.      • ferrous sulfate 325 (65 FE) MG tablet Take 325 mg by mouth Daily With Breakfast.      • furosemide (LASIX) 20 MG tablet Take 20 mg by mouth 2 (Two) Times a Day. [facility] fluid [facility]      • HYDROcodone-acetaminophen (NORCO) 7.5-325 MG per tablet Take 1 tablet by mouth 2 (Two) Times a Day.      • lisinopril (PRINIVIL,ZESTRIL) 10 MG tablet Take 10 mg by mouth Daily.      • melatonin 5 MG tablet tablet Take 10 mg by mouth At Night As Needed.      • pantoprazole (PROTONIX) 20 MG EC tablet Take 20 mg by mouth 2 (Two) Times a Day Before Meals.      • vitamin B-12 (CYANOCOBALAMIN) 1000 MCG tablet Take 1,000 mcg by mouth Daily.        Allergies:  Patient has no known allergies.    Objective     Vital Signs  Temp:  [98.3 °F (36.8 °C)] 98.3 °F (36.8 °C)  Heart Rate:  [55] 55  Resp:  [16] 16  BP: (157)/(94) 157/94    Physical Exam:      General Appearance:    Alert, cooperative, in no acute distress   Head:    Normocephalic, without obvious abnormality, atraumatic   Eyes:            Lids and lashes normal, conjunctivae and sclerae normal, no   icterus, no pallor, corneas clear, PERRLA   Ears:    Ears appear intact with no abnormalities noted   Neck:   No adenopathy, supple, trachea midline   Back:     No kyphosis present, no scoliosis present, no skin lesions,      erythema or scars, no tenderness to percussion or                   palpation,   range of motion normal   Lungs:     Clear to auscultation,respirations regular, even and                  unlabored    Heart:    Regular  rhythm and normal rate, normal S1 and S2, no            murmur, no gallop, no rub, no click   Chest Wall:    No abnormalities observed   Abdomen:    Distended mildly tender right upper quadrant tympanitic in the epigastrium and upper abdomen.  He has a right flank incision as well as a upper midline incision extending below the umbilicus.  He has a small scab in his midline incision that may be related to a suture granuloma   Rectal:     Deferred   Extremities:   Moves all extremities well, no edema, no cyanosis, no             redness   Pulses:   Pulses palpable and equal bilaterally   Skin:   No bleeding, bruising or rash   Lymph nodes:   No palpable adenopathy   Neurologic:   No focal deficits       Results Review:      Lab Results (last 72 hours)     ** No results found for the last 72 hours. **        Imaging Results (Last 72 Hours)     ** No results found for the last 72 hours. **          Assessment/Plan       * No active hospital problems. *      Inguinal admit and let him have clear liquids tonight make him n.p.o. after midnight.  We will start him on some antibiotics.  We will need cardiac clearance before embarking on any surgery.      Karishma Ariza MD  12/16/19  7:46 PM

## 2019-12-17 NOTE — ANESTHESIA PROCEDURE NOTES
Airway  Urgency: elective    Date/Time: 12/17/2019 4:59 PM  Difficult airway    General Information and Staff    Patient location during procedure: PACU  CRNA: Allen Land CRNA    Indications and Patient Condition  Indications for airway management: airway protection    Preoxygenated: yes  Mask difficulty assessment: 1 - vent by mask    Final Airway Details  Final airway type: endotracheal airway      Successful airway: ETT    Successful intubation technique: direct laryngoscopy and video laryngoscopy  Endotracheal tube insertion site: oral  Blade: CMAC  Blade size: D (3.5)  ETT size (mm): 7.5  Cormack-Lehane Classification: grade III - view of epiglottis only  Placement verified by: chest auscultation and capnometry   Measured from: lips  ETT/EBT  to lips (cm): 22  Number of attempts at approach: 2  Assessment: lips, teeth, and gum same as pre-op and atraumatic intubation

## 2019-12-17 NOTE — PROGRESS NOTES
Karishma Ariza MD Progress Note     LOS: 1 day   Patient Care Team:  Lauryn Goodwin APRN as PCP - General (Nurse Practitioner)        Subjective     Feeling good.  Thirsty.  Have spoken with Dr. Polo who read the echo and he is cleared for surgery.    Objective     Vital Signs  Temp:  [97.2 °F (36.2 °C)-98.3 °F (36.8 °C)] 97.6 °F (36.4 °C)  Heart Rate:  [55-70] 69  Resp:  [16-18] 18  BP: (129-157)/(50-94) 135/66    Intake & Output (last 3 days)       12/14 0701 - 12/15 0700 12/15 0701 - 12/16 0700 12/16 0701 - 12/17 0700 12/17 0701 - 12/18 0700    I.V. (mL/kg)    1100 (13)    IV Piggyback   100     Total Intake(mL/kg)   100 (1.2) 1100 (13)    Urine (mL/kg/hr)    300 (0.5)    Total Output    300    Net   +100 +800                  Physical Exam:     General Appearance:    Alert, cooperative, in no acute distress   Lungs:     respirations regular, even and unlabored    Heart:    Regular rhythm and normal rate, normal S1 and S2, no            murmur, no gallop, no rub   Chest Wall:    No abnormalities observed pacemaker right chest wall   Abdomen:      Soft nontender   Extremities: No edema,    Results Review:     I reviewed the patient's new clinical results.    Lab Results (last 72 hours)     Procedure Component Value Units Date/Time    Comprehensive Metabolic Panel [[id number]]  (Abnormal) Collected:  12/17/19 0434    Specimen:  Blood Updated:  12/17/19 0601     Glucose 120 mg/dL      BUN 23 mg/dL      Creatinine 1.24 mg/dL      Sodium 139 mmol/L      Potassium 3.0 mmol/L      Chloride 96 mmol/L      CO2 31.0 mmol/L      Calcium 9.0 mg/dL      Total Protein 6.6 g/dL      Albumin 3.30 g/dL      ALT (SGPT) 14 U/L      AST (SGOT) 13 U/L      Alkaline Phosphatase 103 U/L      Total Bilirubin 0.9 mg/dL      eGFR Non African Amer 58 mL/min/1.73      Globulin 3.3 gm/dL      A/G Ratio 1.0 g/dL      BUN/Creatinine Ratio 18.5     Anion Gap 12.0 mmol/L     Narrative:       GFR Normal >60  Chronic Kidney Disease <60  Kidney  Failure <15      CBC & Differential [[id number]] Collected:  12/17/19 0434    Specimen:  Blood Updated:  12/17/19 0541    Narrative:       The following orders were created for panel order CBC & Differential.  Procedure                               Abnormality         Status                     ---------                               -----------         ------                     CBC Auto Differential[[id number]]        Abnormal            Final result                 Please view results for these tests on the individual orders.    CBC Auto Differential [[id number]]  (Abnormal) Collected:  12/17/19 0434    Specimen:  Blood Updated:  12/17/19 0541     WBC 9.24 10*3/mm3      RBC 3.66 10*6/mm3      Hemoglobin 11.3 g/dL      Hematocrit 33.4 %      MCV 91.3 fL      MCH 30.9 pg      MCHC 33.8 g/dL      RDW 11.9 %      RDW-SD 39.9 fl      MPV 12.5 fL      Platelets 160 10*3/mm3      Neutrophil % 68.2 %      Lymphocyte % 18.1 %      Monocyte % 11.0 %      Eosinophil % 1.8 %      Basophil % 0.5 %      Immature Grans % 0.4 %      Neutrophils, Absolute 6.29 10*3/mm3      Lymphocytes, Absolute 1.67 10*3/mm3      Monocytes, Absolute 1.02 10*3/mm3      Eosinophils, Absolute 0.17 10*3/mm3      Basophils, Absolute 0.05 10*3/mm3      Immature Grans, Absolute 0.04 10*3/mm3      nRBC 0.0 /100 WBC     Amylase [[id number]]  (Normal) Collected:  12/16/19 2341    Specimen:  Blood Updated:  12/17/19 0007     Amylase 41 U/L     Lipase [[id number]]  (Normal) Collected:  12/16/19 2341    Specimen:  Blood Updated:  12/17/19 0007     Lipase 49 U/L     Creatinine, Serum [[id number]]  (Normal) Collected:  12/16/19 2016    Specimen:  Blood Updated:  12/16/19 2029     Creatinine 1.14 mg/dL      eGFR Non African Amer 64 mL/min/1.73     Narrative:       GFR Normal >60  Chronic Kidney Disease <60  Kidney Failure <15          Imaging Results (Last 72 Hours)     Procedure Component Value Units Date/Time    XR Chest 1 View [[id number]] Collected:  12/16/19  2024     Updated:  12/16/19 2027    Narrative:       XR CHEST 1 VW- 12/16/2019 8:11 PM CST     HISTORY: History of congestive failure       COMPARISON: 02/21/2014.     FINDINGS:   Lung volumes are decreased. An AICD lead is present in the RIGHT  ventricle. Changes of previous sternotomy are seen. The  cardiomediastinal silhouette and pulmonary vascularity are unchanged.      The osseous structures and surrounding soft tissues demonstrate no acute  abnormality.       Impression:       1. No radiographic evidence of acute cardiopulmonary process.        This report was finalized on 12/16/2019 20:24 by Dr. Cj Hopper MD.              Assessment/Plan       Cholecystitis with cholelithiasis      We will proceed with lap scopic ostectomy with cholangiogram.  The risks of bleeding infection injury cardiac complications pulmonary complications blood clots pneumonias or difficulty due to the his prior surgical history and needing to convert to an open procedure all has been discussed.      Karishma Ariza MD  12/17/19  2:13 PM

## 2019-12-17 NOTE — ANESTHESIA PREPROCEDURE EVALUATION
Anesthesia Evaluation     Patient summary reviewed   no history of anesthetic complications:  NPO Solid Status: > 8 hours  NPO Liquid Status: > 8 hours           Airway   Mallampati: I  Neck ROM: limited  No difficulty expected and Possible difficult intubation  Dental - normal exam     Pulmonary    (+) a smoker Former,   Cardiovascular   Exercise tolerance: good (4-7 METS)    Patient on routine beta blocker and Beta blocker given within 24 hours of surgery    (+) pacemaker ICD, hypertension, CHF Diastolic >=55% and Systolic <55%,   (-) anginaCAB.    ROS comment: Echo:  · Estimated EF = 45%.  · Left ventricular diastolic dysfunction.  · Left atrial cavity size is mildly dilated.  · Mild aortic valve regurgitation is present.  · No evidence of pulmonary hypertension is present    Neuro/Psych- negative ROS  GI/Hepatic/Renal/Endo    (+)  GERD,  renal disease CRI,   (-) liver disease, diabetes    Musculoskeletal     Abdominal    Substance History      OB/GYN          Other                      Anesthesia Plan    ASA 3     general   (Will need magnet in room)  intravenous induction     Anesthetic plan, all risks, benefits, and alternatives have been provided, discussed and informed consent has been obtained with: patient.

## 2019-12-18 LAB
ALBUMIN SERPL-MCNC: 3.1 G/DL (ref 3.5–5.2)
ALBUMIN/GLOB SERPL: 0.8 G/DL
ALP SERPL-CCNC: 130 U/L (ref 39–117)
ALT SERPL W P-5'-P-CCNC: 30 U/L (ref 1–41)
ANION GAP SERPL CALCULATED.3IONS-SCNC: 11 MMOL/L (ref 5–15)
AST SERPL-CCNC: 41 U/L (ref 1–40)
BASOPHILS # BLD AUTO: 0.02 10*3/MM3 (ref 0–0.2)
BASOPHILS NFR BLD AUTO: 0.2 % (ref 0–1.5)
BILIRUB SERPL-MCNC: 0.5 MG/DL (ref 0.2–1.2)
BUN BLD-MCNC: 22 MG/DL (ref 8–23)
BUN/CREAT SERPL: 17.3 (ref 7–25)
CALCIUM SPEC-SCNC: 9 MG/DL (ref 8.6–10.5)
CHLORIDE SERPL-SCNC: 97 MMOL/L (ref 98–107)
CO2 SERPL-SCNC: 30 MMOL/L (ref 22–29)
CREAT BLD-MCNC: 1.27 MG/DL (ref 0.76–1.27)
DEPRECATED RDW RBC AUTO: 39 FL (ref 37–54)
EOSINOPHIL # BLD AUTO: 0 10*3/MM3 (ref 0–0.4)
EOSINOPHIL NFR BLD AUTO: 0 % (ref 0.3–6.2)
ERYTHROCYTE [DISTWIDTH] IN BLOOD BY AUTOMATED COUNT: 11.8 % (ref 12.3–15.4)
GFR SERPL CREATININE-BSD FRML MDRD: 56 ML/MIN/1.73
GLOBULIN UR ELPH-MCNC: 3.7 GM/DL
GLUCOSE BLD-MCNC: 200 MG/DL (ref 65–99)
HCT VFR BLD AUTO: 33.9 % (ref 37.5–51)
HGB BLD-MCNC: 11.4 G/DL (ref 13–17.7)
IMM GRANULOCYTES # BLD AUTO: 0.08 10*3/MM3 (ref 0–0.05)
IMM GRANULOCYTES NFR BLD AUTO: 0.8 % (ref 0–0.5)
LYMPHOCYTES # BLD AUTO: 0.86 10*3/MM3 (ref 0.7–3.1)
LYMPHOCYTES NFR BLD AUTO: 9.1 % (ref 19.6–45.3)
MCH RBC QN AUTO: 30.5 PG (ref 26.6–33)
MCHC RBC AUTO-ENTMCNC: 33.6 G/DL (ref 31.5–35.7)
MCV RBC AUTO: 90.6 FL (ref 79–97)
MONOCYTES # BLD AUTO: 0.51 10*3/MM3 (ref 0.1–0.9)
MONOCYTES NFR BLD AUTO: 5.4 % (ref 5–12)
NEUTROPHILS # BLD AUTO: 7.95 10*3/MM3 (ref 1.7–7)
NEUTROPHILS NFR BLD AUTO: 84.5 % (ref 42.7–76)
NRBC BLD AUTO-RTO: 0 /100 WBC (ref 0–0.2)
PLATELET # BLD AUTO: 191 10*3/MM3 (ref 140–450)
PMV BLD AUTO: 11.9 FL (ref 6–12)
POTASSIUM BLD-SCNC: 3.8 MMOL/L (ref 3.5–5.2)
PROT SERPL-MCNC: 6.8 G/DL (ref 6–8.5)
RBC # BLD AUTO: 3.74 10*6/MM3 (ref 4.14–5.8)
SODIUM BLD-SCNC: 138 MMOL/L (ref 136–145)
WBC NRBC COR # BLD: 9.42 10*3/MM3 (ref 3.4–10.8)

## 2019-12-18 PROCEDURE — 85025 COMPLETE CBC W/AUTO DIFF WBC: CPT | Performed by: SPECIALIST

## 2019-12-18 PROCEDURE — 80053 COMPREHEN METABOLIC PANEL: CPT | Performed by: SPECIALIST

## 2019-12-18 PROCEDURE — 25010000002 PIPERACILLIN SOD-TAZOBACTAM PER 1 G: Performed by: SPECIALIST

## 2019-12-18 PROCEDURE — 99232 SBSQ HOSP IP/OBS MODERATE 35: CPT | Performed by: INTERNAL MEDICINE

## 2019-12-18 RX ADMIN — TAZOBACTAM SODIUM AND PIPERACILLIN SODIUM 3.38 G: 375; 3 INJECTION, SOLUTION INTRAVENOUS at 19:42

## 2019-12-18 RX ADMIN — CARVEDILOL 25 MG: 25 TABLET, FILM COATED ORAL at 08:44

## 2019-12-18 RX ADMIN — TAZOBACTAM SODIUM AND PIPERACILLIN SODIUM 3.38 G: 375; 3 INJECTION, SOLUTION INTRAVENOUS at 12:19

## 2019-12-18 RX ADMIN — TAZOBACTAM SODIUM AND PIPERACILLIN SODIUM 3.38 G: 375; 3 INJECTION, SOLUTION INTRAVENOUS at 02:35

## 2019-12-18 RX ADMIN — SODIUM CHLORIDE, POTASSIUM CHLORIDE, SODIUM LACTATE AND CALCIUM CHLORIDE 100 ML/HR: 600; 310; 30; 20 INJECTION, SOLUTION INTRAVENOUS at 12:10

## 2019-12-18 RX ADMIN — LISINOPRIL 10 MG: 10 TABLET ORAL at 08:44

## 2019-12-18 RX ADMIN — AMITRIPTYLINE HYDROCHLORIDE 25 MG: 25 TABLET, FILM COATED ORAL at 20:49

## 2019-12-18 RX ADMIN — SODIUM CHLORIDE, POTASSIUM CHLORIDE, SODIUM LACTATE AND CALCIUM CHLORIDE 100 ML/HR: 600; 310; 30; 20 INJECTION, SOLUTION INTRAVENOUS at 21:50

## 2019-12-18 RX ADMIN — CARVEDILOL 25 MG: 25 TABLET, FILM COATED ORAL at 17:11

## 2019-12-18 RX ADMIN — FAMOTIDINE 20 MG: 10 INJECTION, SOLUTION INTRAVENOUS at 20:49

## 2019-12-18 RX ADMIN — HYDROCODONE BITARTRATE AND ACETAMINOPHEN 1 TABLET: 7.5; 325 TABLET ORAL at 15:28

## 2019-12-18 RX ADMIN — HYDROCODONE BITARTRATE AND ACETAMINOPHEN 1 TABLET: 7.5; 325 TABLET ORAL at 23:20

## 2019-12-18 NOTE — PROGRESS NOTES
Karishma Ariza MD Progress Note     LOS: 2 days   Patient Care Team:  Lauryn Goodwin APRN as PCP - General (Nurse Practitioner)        Subjective     Operative findings explained.    Objective     Vital Signs  Temp:  [97.2 °F (36.2 °C)-98 °F (36.7 °C)] 97.6 °F (36.4 °C)  Heart Rate:  [46-88] 77  Resp:  [15-18] 16  BP: (125-187)/() 139/69    Intake & Output (last 3 days)       12/15 0701 - 12/16 0700 12/16 0701 - 12/17 0700 12/17 0701 - 12/18 0700 12/18 0701 - 12/19 0700    I.V. (mL/kg)   1900 (22.5)     IV Piggyback  100 50     Total Intake(mL/kg)  100 (1.2) 1950 (23.1)     Urine (mL/kg/hr)   750 (0.4)     Drains   60     Total Output   810     Net  +100 +1140                   Physical Exam:     General Appearance:    Alert, cooperative, in no acute distress   Lungs:     respirations regular, even and unlabored    Heart:    Regular rhythm and normal rate, normal S1 and S2, no            murmur, no gallop, no rub   Chest Wall:    No abnormalities observed   Abdomen:      JAMIE bloody   Extremities: No edema,    Results Review:     I reviewed the patient's new clinical results.    Lab Results (last 72 hours)     Procedure Component Value Units Date/Time    Comprehensive Metabolic Panel [[id number]]  (Abnormal) Collected:  12/18/19 0425    Specimen:  Blood Updated:  12/18/19 0529     Glucose 200 mg/dL      BUN 22 mg/dL      Creatinine 1.27 mg/dL      Sodium 138 mmol/L      Potassium 3.8 mmol/L      Chloride 97 mmol/L      CO2 30.0 mmol/L      Calcium 9.0 mg/dL      Total Protein 6.8 g/dL      Albumin 3.10 g/dL      ALT (SGPT) 30 U/L      AST (SGOT) 41 U/L      Alkaline Phosphatase 130 U/L      Total Bilirubin 0.5 mg/dL      eGFR Non African Amer 56 mL/min/1.73      Globulin 3.7 gm/dL      A/G Ratio 0.8 g/dL      BUN/Creatinine Ratio 17.3     Anion Gap 11.0 mmol/L     Narrative:       GFR Normal >60  Chronic Kidney Disease <60  Kidney Failure <15      CBC & Differential [[id number]] Collected:  12/18/19 0423     Specimen:  Blood Updated:  12/18/19 0510    Narrative:       The following orders were created for panel order CBC & Differential.  Procedure                               Abnormality         Status                     ---------                               -----------         ------                     CBC Auto Differential[[id number]]        Abnormal            Final result                 Please view results for these tests on the individual orders.    CBC Auto Differential [[id number]]  (Abnormal) Collected:  12/18/19 0425    Specimen:  Blood Updated:  12/18/19 0510     WBC 9.42 10*3/mm3      RBC 3.74 10*6/mm3      Hemoglobin 11.4 g/dL      Hematocrit 33.9 %      MCV 90.6 fL      MCH 30.5 pg      MCHC 33.6 g/dL      RDW 11.8 %      RDW-SD 39.0 fl      MPV 11.9 fL      Platelets 191 10*3/mm3      Neutrophil % 84.5 %      Lymphocyte % 9.1 %      Monocyte % 5.4 %      Eosinophil % 0.0 %      Basophil % 0.2 %      Immature Grans % 0.8 %      Neutrophils, Absolute 7.95 10*3/mm3      Lymphocytes, Absolute 0.86 10*3/mm3      Monocytes, Absolute 0.51 10*3/mm3      Eosinophils, Absolute 0.00 10*3/mm3      Basophils, Absolute 0.02 10*3/mm3      Immature Grans, Absolute 0.08 10*3/mm3      nRBC 0.0 /100 WBC     Comprehensive Metabolic Panel [[id number]]  (Abnormal) Collected:  12/17/19 0434    Specimen:  Blood Updated:  12/17/19 0601     Glucose 120 mg/dL      BUN 23 mg/dL      Creatinine 1.24 mg/dL      Sodium 139 mmol/L      Potassium 3.0 mmol/L      Chloride 96 mmol/L      CO2 31.0 mmol/L      Calcium 9.0 mg/dL      Total Protein 6.6 g/dL      Albumin 3.30 g/dL      ALT (SGPT) 14 U/L      AST (SGOT) 13 U/L      Alkaline Phosphatase 103 U/L      Total Bilirubin 0.9 mg/dL      eGFR Non African Amer 58 mL/min/1.73      Globulin 3.3 gm/dL      A/G Ratio 1.0 g/dL      BUN/Creatinine Ratio 18.5     Anion Gap 12.0 mmol/L     Narrative:       GFR Normal >60  Chronic Kidney Disease <60  Kidney Failure <15      CBC & Differential  [[id number]] Collected:  12/17/19 0434    Specimen:  Blood Updated:  12/17/19 0541    Narrative:       The following orders were created for panel order CBC & Differential.  Procedure                               Abnormality         Status                     ---------                               -----------         ------                     CBC Auto Differential[[id number]]        Abnormal            Final result                 Please view results for these tests on the individual orders.    CBC Auto Differential [[id number]]  (Abnormal) Collected:  12/17/19 0434    Specimen:  Blood Updated:  12/17/19 0541     WBC 9.24 10*3/mm3      RBC 3.66 10*6/mm3      Hemoglobin 11.3 g/dL      Hematocrit 33.4 %      MCV 91.3 fL      MCH 30.9 pg      MCHC 33.8 g/dL      RDW 11.9 %      RDW-SD 39.9 fl      MPV 12.5 fL      Platelets 160 10*3/mm3      Neutrophil % 68.2 %      Lymphocyte % 18.1 %      Monocyte % 11.0 %      Eosinophil % 1.8 %      Basophil % 0.5 %      Immature Grans % 0.4 %      Neutrophils, Absolute 6.29 10*3/mm3      Lymphocytes, Absolute 1.67 10*3/mm3      Monocytes, Absolute 1.02 10*3/mm3      Eosinophils, Absolute 0.17 10*3/mm3      Basophils, Absolute 0.05 10*3/mm3      Immature Grans, Absolute 0.04 10*3/mm3      nRBC 0.0 /100 WBC     Amylase [[id number]]  (Normal) Collected:  12/16/19 2341    Specimen:  Blood Updated:  12/17/19 0007     Amylase 41 U/L     Lipase [[id number]]  (Normal) Collected:  12/16/19 2341    Specimen:  Blood Updated:  12/17/19 0007     Lipase 49 U/L     Creatinine, Serum [[id number]]  (Normal) Collected:  12/16/19 2016    Specimen:  Blood Updated:  12/16/19 2029     Creatinine 1.14 mg/dL      eGFR Non African Amer 64 mL/min/1.73     Narrative:       GFR Normal >60  Chronic Kidney Disease <60  Kidney Failure <15          Imaging Results (Last 72 Hours)     Procedure Component Value Units Date/Time    XR Chest 1 View [[id number]] Collected:  12/16/19 2024     Updated:  12/16/19 2027     Narrative:       XR CHEST 1 VW- 12/16/2019 8:11 PM CST     HISTORY: History of congestive failure       COMPARISON: 02/21/2014.     FINDINGS:   Lung volumes are decreased. An AICD lead is present in the RIGHT  ventricle. Changes of previous sternotomy are seen. The  cardiomediastinal silhouette and pulmonary vascularity are unchanged.      The osseous structures and surrounding soft tissues demonstrate no acute  abnormality.       Impression:       1. No radiographic evidence of acute cardiopulmonary process.        This report was finalized on 12/16/2019 20:24 by Dr. Cj Hopper MD.              Assessment/Plan       Cholecystitis with cholelithiasis      We will continue IV antibiotics today with hope for discharge tomorrow.      Karishma Ariza MD  12/18/19  8:22 AM

## 2019-12-18 NOTE — PROGRESS NOTES
LOS: 2 days   Patient Care Team:  Lauryn Goodwin APRN as PCP - General (Nurse Practitioner)    Chief Complaint:   Cholecystitis with cholelithiasis         Subjective    Guy Crawford is a 69 y.o. malewho is being seen in follow-up  Denies chest pain  No palpitation  Abdominal pain significantly improved  Uneventful abdominal surgery yesterday  Still has drain in place  Sitting up and eating breakfast  Latest lab results reviewed  Hemodynamically stable  Resting better      Telemetry: no malignant arrhythmia. No significant pauses.    Review of Systems     Constitutional: No chills   Has fatigue   No fever.     HENT: Negative.    Eyes: Negative.      Respiratory: Negative for cough,   No chest wall soreness,   Shortness of breath,   no wheezing, no stridor.      Cardiovascular: As above    Gastrointestinal: Some abdominal discomfort but much improved   endocrine: Negative.    Genitourinary: Negative for difficulty urinating, dysuria, flank pain and hematuria.     Musculoskeletal: Negative.    Skin: Negative for rash and wound.   Allergic/Immunologic: Negative.      Neurological: Negative for dizziness, syncope, weakness,   No light-headedness  No  headaches.     Hematological: Does not bruise/bleed easily.     Psychiatric/Behavioral: Negative for agitation or behavioral problems,   No confusion,   the patient is  nervous/anxious.       History:   Past Medical History:   Diagnosis Date   • Hard to intubate     CMAC D blade     Past Surgical History:   Procedure Laterality Date   • CARDIAC CATHETERIZATION     • CARDIAC SURGERY     • CHOLECYSTECTOMY WITH INTRAOPERATIVE CHOLANGIOGRAM N/A 12/17/2019    Procedure: CHOLECYSTECTOMY, LAPAROSCOPIC;  Surgeon: Karishma Ariza MD;  Location: [facility];  Service: General   • KIDNEY SURGERY       Social History     Socioeconomic History   • Marital status:      Spouse name: Not on file   • Number of children: Not on file   • Years of education: Not on file   • Highest  education level: Not on file   Tobacco Use   • Smoking status: Former Smoker     Packs/day: 1.00     Years: 40.00     Pack years: 40.00     Last attempt to quit: 2011     Years since quittin.0   • Smokeless tobacco: Never Used   Substance and Sexual Activity   • Drug use: Never   • Sexual activity: Defer     History reviewed. No pertinent family history.    Labs:  WBC WBC   Date Value Ref Range Status   2019 9.42 3.40 - 10.80 10*3/mm3 Final   2019 9.24 3.40 - 10.80 10*3/mm3 Final      HGB Hemoglobin   Date Value Ref Range Status   2019 11.4 (L) 13.0 - 17.7 g/dL Final   2019 11.3 (L) 13.0 - 17.7 g/dL Final      HCT Hematocrit   Date Value Ref Range Status   2019 33.9 (L) 37.5 - 51.0 % Final   2019 33.4 (L) 37.5 - 51.0 % Final      Platelets Platelets   Date Value Ref Range Status   2019 191 140 - 450 10*3/mm3 Final   2019 160 140 - 450 10*3/mm3 Final      MCV MCV   Date Value Ref Range Status   2019 90.6 79.0 - 97.0 fL Final   2019 91.3 79.0 - 97.0 fL Final        Results from last 7 days   Lab Units 19  0425 19  0434 19   SODIUM mmol/L 138 139  --    POTASSIUM mmol/L 3.8 3.0*  --    CHLORIDE mmol/L 97* 96*  --    CO2 mmol/L 30.0* 31.0*  --    BUN mg/dL 22 23  --    CREATININE mg/dL 1.27 1.24 1.14   CALCIUM mg/dL 9.0 9.0  --    BILIRUBIN mg/dL 0.5 0.9  --    ALK PHOS U/L 130* 103  --    ALT (SGPT) U/L 30 14  --    AST (SGOT) U/L 41* 13  --    GLUCOSE mg/dL 200* 120*  --    No results found for: CKTOTAL, CKMB, CKMBINDEX, TROPONINI, TROPONINT  PT/INR:  No results found for: PROTIME/No results found for: INR    Imaging Results (Last 72 Hours)     Procedure Component Value Units Date/Time    XR Chest 1 View [[id number]] Collected:  19     Updated:  19    Narrative:       XR CHEST 1 VW- 2019 8:11 PM CST     HISTORY: History of congestive failure       COMPARISON: 2014.     FINDINGS:   Lung volumes  are decreased. An AICD lead is present in the RIGHT  ventricle. Changes of previous sternotomy are seen. The  cardiomediastinal silhouette and pulmonary vascularity are unchanged.      The osseous structures and surrounding soft tissues demonstrate no acute  abnormality.       Impression:       1. No radiographic evidence of acute cardiopulmonary process.        This report was finalized on 12/16/2019 20:24 by Dr. Cj Hopper MD.          Objective     No Known Allergies    Medication Review: Performed  Current Facility-Administered Medications   Medication Dose Route Frequency Provider Last Rate Last Dose   • ALPRAZolam (XANAX) tablet 0.5 mg  0.5 mg Oral Daily PRN Karishma Ariza MD       • amitriptyline (ELAVIL) tablet 25 mg  25 mg Oral Nightly Karishma Ariza MD   25 mg at 12/17/19 2013   • carvedilol (COREG) tablet 25 mg  25 mg Oral BID With Meals Karishma Ariza MD   25 mg at 12/18/19 0844   • dextrose 5 % and sodium chloride 0.45 % with KCl 20 mEq/L infusion  75 mL/hr Intravenous Continuous Karishma Ariza MD 75 mL/hr at 12/17/19 1353 75 mL/hr at 12/17/19 1353   • famotidine (PEPCID) injection 20 mg  20 mg Intravenous BID Karishma Ariza MD   20 mg at 12/17/19 2032   • HYDROcodone-acetaminophen (NORCO) 7.5-325 MG per tablet 1 tablet  1 tablet Oral Q4H PRN Karishma Ariza MD       • lactated ringers infusion  100 mL/hr Intravenous Continuous Karishma Ariza  mL/hr at 12/17/19 1643 100 mL/hr at 12/17/19 1643   • lisinopril (PRINIVIL,ZESTRIL) tablet 10 mg  10 mg Oral Daily Karishma Ariza MD   10 mg at 12/18/19 0844   • melatonin tablet 3 mg  3 mg Oral Nightly PRN Karishma Ariza MD       • morphine injection 4 mg  4 mg Intravenous Q3H PRN Karishma Ariza MD   4 mg at 12/17/19 2031    And   • naloxone (NARCAN) injection 0.4 mg  0.4 mg Intravenous Q5 Min PRN Karishma Ariza MD       • Morphine sulfate (PF) injection 2 mg  2 mg Intravenous Q3H PRN Karishma Ariza MD        And   • naloxone  "(NARCAN) injection 0.4 mg  0.4 mg Intravenous Q5 Min PRN Karishma Ariza MD       • ondansetron (ZOFRAN) injection 4 mg  4 mg Intravenous Q6H PRN Karishma Ariza MD       • oxyCODONE-acetaminophen (PERCOCET)  MG per tablet 1 tablet  1 tablet Oral Q4H PRN Karishma Ariza MD       • piperacillin-tazobactam (ZOSYN) 3.375 g in iso-osmotic dextrose 50 ml (premix)  3.375 g Intravenous Q8H Karishma Ariza MD   3.375 g at 12/18/19 0235   • sodium chloride 0.9 % flush 10 mL  10 mL Intravenous Q12H Karishma Ariza MD   10 mL at 12/17/19 2038   • sodium chloride 0.9 % flush 10 mL  10 mL Intravenous PRN Karishma Ariza MD           Vital Sign Min/Max for last 24 hours  Temp  Min: 97.2 °F (36.2 °C)  Max: 98 °F (36.7 °C)   BP  Min: 125/73  Max: 187/83   Pulse  Min: 46  Max: 88   Resp  Min: 15  Max: 18   SpO2  Min: 91 %  Max: 100 %   No data recorded   Weight  Min: 84.4 kg (186 lb)  Max: 84.4 kg (186 lb)     Flowsheet Rows      First Filed Value   Admission Height  172.7 cm (68\") Documented at 12/16/2019 1640   Admission Weight  84.4 kg (186 lb) Documented at 12/16/2019 1640          Results for orders placed during the hospital encounter of 12/16/19   STAT Adult Transthoracic Echo Complete W/ Cont if Necessary Per Protocol    Narrative · Estimated EF = 45%.  · Left ventricular diastolic dysfunction.  · Left atrial cavity size is mildly dilated.  · Mild aortic valve regurgitation is present.  · No evidence of pulmonary hypertension is present          Physical Exam:    General Appearance: Awake, alert, in no acute distress  Eyes: Pupils equal and reactive    Ears: Appear intact with no abnormalities noted  Nose: Nares normal, no drainage  Neck: supple, trachea midline, no carotid bruit and no JVD  Back: no kyphosis present,    Lungs: respirations regular, respirations even and respirations unlabored    Heart: normal S1, S2,  2/6 pansystolic murmur in the left sternal border,  no rub and no click    Abdomen: normal " bowel sounds, no tenderness   Skin: no bleeding, bruising or rash  Extremities: no cyanosis  Psychiatric/Behavioral: Negative for agitation, behavioral problems, confusion, the patient does  appear to be nervous/anxious.          Results Review:   I reviewed the patient's new clinical results.  I reviewed the patient's new imaging results and agree with the interpretation.  I reviewed the patient's other test results and agree with the interpretation  I personally viewed and interpreted the patient's EKG/Telemetry data  Discussed with patient    Reviewed available prior notes, consults, prior visits, laboratory findings, radiology and cardiology relevant reports.   Updated chart as applicable.   I have reviewed the patient's medical history in detail and updated the computerized patient record as relevant.      Updated patient regarding any new or relevant abnormalities on review of records or any new findings on physical exam.   Mentioned to patient about purpose of visit and desirable health short and long term goals and objectives.     Assessment/Plan       Cholecystitis with cholelithiasis  Cardiomyopathy  Congestive heart failure  Prior ICD placement      Plan  Results of echocardiogram discussed with patient  Continue current medication  See me in follow-up in approximately 3 months  Monitor device function as outpatient  will sign off  Week or as needed  Telemetry  Deep vein thrombosis prophylaxis/precautions  Appropriate diet, fluid, sodium, caffeine, stimulants intake   Questions were encouraged, asked and answered to the patient's  understanding and satisfaction.  Compliance to diet and medications       Errol Polo MD  12/18/19  9:17 AM    EMR Dragon/Transcription was used to dictate part of this note

## 2019-12-18 NOTE — PLAN OF CARE
Problem: Patient Care Overview  Goal: Plan of Care Review  Outcome: Ongoing (interventions implemented as appropriate)  Flowsheets (Taken 12/18/2019 0332)  Progress: improving  Plan of Care Reviewed With: patient  Note:   Lap cholecystectomy on 12-17 with lap x 4 and JAMIE x 1 with gauze and tegaderm;  voided after Sx;  regular diet;  SCD's and on telemetry;  BP elevated  but came back down to 162/xx

## 2019-12-18 NOTE — ANESTHESIA POSTPROCEDURE EVALUATION
Patient: Guy Crawford    Procedure Summary     Date:  12/17/19 Room / Location:   [facility]  /  [facility]    Anesthesia Start:  1648 Anesthesia Stop:  1822    Procedure:  CHOLECYSTECTOMY, LAPAROSCOPIC (N/A Abdomen) Diagnosis:      Surgeon:  Karishma Ariza MD Provider:  Magdalena Puente CRNA    Anesthesia Type:  general ASA Status:  3          Anesthesia Type: general    Vitals  Vitals Value Taken Time   /75 12/17/2019  7:10 PM   Temp 97.6 °F (36.4 °C) 12/17/2019  7:10 PM   Pulse 72 12/17/2019  7:10 PM   Resp 15 12/17/2019  7:10 PM   SpO2 97 % 12/17/2019  7:10 PM           Post Anesthesia Care and Evaluation    Patient location during evaluation: PACU  Level of consciousness: awake  Pain management: adequate  Airway patency: patent  Anesthetic complications: No anesthetic complications  PONV Status: none  Cardiovascular status: acceptable  Respiratory status: acceptable  Hydration status: acceptable

## 2019-12-18 NOTE — PLAN OF CARE
Doctor kept patient another day for iv antibiotics Patient up ad todd, been in chair most of day. Pain x1 covered with Pain Pill see MAR.

## 2019-12-18 NOTE — PROGRESS NOTES
Discharge Planning Assessment   Beatrice     Patient Name: Guy Crawford  MRN: [id number]  Today's Date: 12/18/2019    Admit Date: 12/16/2019    Discharge Needs Assessment     Row Name 12/18/19 1221       Living Environment    Lives With  spouse    Current Living Arrangements  home/apartment/condo    Primary Care Provided by  self    Provides Primary Care For  no one    Quality of Family Relationships  helpful;involved;supportive    Able to Return to Prior Arrangements  yes       Resource/Environmental Concerns    Resource/Environmental Concerns  none       Transition Planning    Patient/Family Anticipates Transition to  home with family    Patient/Family Anticipated Services at Transition  none    Transportation Anticipated  family or friend will provide       Discharge Needs Assessment    Readmission Within the Last 30 Days  no previous admission in last 30 days    Concerns to be Addressed  denies needs/concerns at this time    Equipment Currently Used at Home  none    Anticipated Changes Related to Illness  none    Equipment Needed After Discharge  none    Discharge Coordination/Progress  Pt lives at home with his spouse. He states he is independent and plans to return home at d/c. He does not want home health. Pt has rx coverage and gets most of his medication through the [facility] clinic. Pt denies needs.         Discharge Plan    No documentation.       Destination      Coordination has not been started for this encounter.      Durable Medical Equipment      Coordination has not been started for this encounter.      Dialysis/Infusion      Coordination has not been started for this encounter.      Home Medical Care      Coordination has not been started for this encounter.      Therapy      Coordination has not been started for this encounter.      Community Resources      Coordination has not been started for this encounter.          Demographic Summary    No documentation.       Functional Status    No  documentation.       Psychosocial    No documentation.       Abuse/Neglect    No documentation.       Legal    No documentation.       Substance Abuse    No documentation.       Patient Forms    No documentation.           TERRY Rodney

## 2019-12-19 VITALS
BODY MASS INDEX: 28.19 KG/M2 | TEMPERATURE: 97.6 F | HEIGHT: 68 IN | HEART RATE: 61 BPM | OXYGEN SATURATION: 97 % | SYSTOLIC BLOOD PRESSURE: 179 MMHG | RESPIRATION RATE: 18 BRPM | WEIGHT: 186 LBS | DIASTOLIC BLOOD PRESSURE: 82 MMHG

## 2019-12-19 LAB
ALBUMIN SERPL-MCNC: 3.3 G/DL (ref 3.5–5.2)
ALBUMIN/GLOB SERPL: 1.1 G/DL
ALP SERPL-CCNC: 93 U/L (ref 39–117)
ALT SERPL W P-5'-P-CCNC: 26 U/L (ref 1–41)
ANION GAP SERPL CALCULATED.3IONS-SCNC: 12 MMOL/L (ref 5–15)
AST SERPL-CCNC: 24 U/L (ref 1–40)
BASOPHILS # BLD AUTO: 0.04 10*3/MM3 (ref 0–0.2)
BASOPHILS NFR BLD AUTO: 0.4 % (ref 0–1.5)
BILIRUB SERPL-MCNC: 0.6 MG/DL (ref 0.2–1.2)
BUN BLD-MCNC: 18 MG/DL (ref 8–23)
BUN/CREAT SERPL: 16.1 (ref 7–25)
CALCIUM SPEC-SCNC: 9 MG/DL (ref 8.6–10.5)
CHLORIDE SERPL-SCNC: 98 MMOL/L (ref 98–107)
CO2 SERPL-SCNC: 29 MMOL/L (ref 22–29)
CREAT BLD-MCNC: 1.12 MG/DL (ref 0.76–1.27)
DEPRECATED RDW RBC AUTO: 40 FL (ref 37–54)
EOSINOPHIL # BLD AUTO: 0.08 10*3/MM3 (ref 0–0.4)
EOSINOPHIL NFR BLD AUTO: 0.9 % (ref 0.3–6.2)
ERYTHROCYTE [DISTWIDTH] IN BLOOD BY AUTOMATED COUNT: 11.9 % (ref 12.3–15.4)
GFR SERPL CREATININE-BSD FRML MDRD: 65 ML/MIN/1.73
GLOBULIN UR ELPH-MCNC: 3.1 GM/DL
GLUCOSE BLD-MCNC: 107 MG/DL (ref 65–99)
HCT VFR BLD AUTO: 33.5 % (ref 37.5–51)
HGB BLD-MCNC: 11.2 G/DL (ref 13–17.7)
IMM GRANULOCYTES # BLD AUTO: 0.05 10*3/MM3 (ref 0–0.05)
IMM GRANULOCYTES NFR BLD AUTO: 0.6 % (ref 0–0.5)
LYMPHOCYTES # BLD AUTO: 1.58 10*3/MM3 (ref 0.7–3.1)
LYMPHOCYTES NFR BLD AUTO: 17.7 % (ref 19.6–45.3)
MCH RBC QN AUTO: 30.7 PG (ref 26.6–33)
MCHC RBC AUTO-ENTMCNC: 33.4 G/DL (ref 31.5–35.7)
MCV RBC AUTO: 91.8 FL (ref 79–97)
MONOCYTES # BLD AUTO: 0.84 10*3/MM3 (ref 0.1–0.9)
MONOCYTES NFR BLD AUTO: 9.4 % (ref 5–12)
NEUTROPHILS # BLD AUTO: 6.35 10*3/MM3 (ref 1.7–7)
NEUTROPHILS NFR BLD AUTO: 71 % (ref 42.7–76)
NRBC BLD AUTO-RTO: 0 /100 WBC (ref 0–0.2)
PLATELET # BLD AUTO: 199 10*3/MM3 (ref 140–450)
PMV BLD AUTO: 11.8 FL (ref 6–12)
POTASSIUM BLD-SCNC: 3.4 MMOL/L (ref 3.5–5.2)
PROT SERPL-MCNC: 6.4 G/DL (ref 6–8.5)
RBC # BLD AUTO: 3.65 10*6/MM3 (ref 4.14–5.8)
SODIUM BLD-SCNC: 139 MMOL/L (ref 136–145)
WBC NRBC COR # BLD: 8.94 10*3/MM3 (ref 3.4–10.8)

## 2019-12-19 PROCEDURE — 85025 COMPLETE CBC W/AUTO DIFF WBC: CPT | Performed by: SPECIALIST

## 2019-12-19 PROCEDURE — 25010000002 PIPERACILLIN SOD-TAZOBACTAM PER 1 G: Performed by: SPECIALIST

## 2019-12-19 PROCEDURE — 80053 COMPREHEN METABOLIC PANEL: CPT | Performed by: SPECIALIST

## 2019-12-19 RX ORDER — HYDROCODONE BITARTRATE AND ACETAMINOPHEN 7.5; 325 MG/1; MG/1
1 TABLET ORAL EVERY 4 HOURS PRN
Qty: 30 TABLET | Refills: 0 | Status: SHIPPED | OUTPATIENT
Start: 2019-12-19

## 2019-12-19 RX ADMIN — HYDROCODONE BITARTRATE AND ACETAMINOPHEN 1 TABLET: 7.5; 325 TABLET ORAL at 05:21

## 2019-12-19 RX ADMIN — HYDROCODONE BITARTRATE AND ACETAMINOPHEN 1 TABLET: 7.5; 325 TABLET ORAL at 11:46

## 2019-12-19 RX ADMIN — CARVEDILOL 25 MG: 25 TABLET, FILM COATED ORAL at 08:40

## 2019-12-19 RX ADMIN — LISINOPRIL 10 MG: 10 TABLET ORAL at 08:40

## 2019-12-19 RX ADMIN — TAZOBACTAM SODIUM AND PIPERACILLIN SODIUM 3.38 G: 375; 3 INJECTION, SOLUTION INTRAVENOUS at 03:18

## 2019-12-19 NOTE — DISCHARGE SUMMARY
Karishma Ariza MD Discharge Summary    Date of Admission: 12/16/2019  Date of Discharge:  12/19/2019    Discharge Diagnosis: Acute cholecystitis, history of cardiomyopathy and congestive heart failure    Presenting Problem/History of Present Illness    History and Physical as outlined in the chart    Hospital Course  Patient was admitted after undergoing the above operation.  Hospital course postoperatively was uneventful.,  No inflammation was severe so I kept him an extra night on IV antibiotics patient will be discharged to home.  See medication reconciliation for medications at discharge.    Procedures Performed  Procedure(s):  CHOLECYSTECTOMY, LAPAROSCOPIC       Consults:   Consults     Date and Time Order Name Status Description    12/16/2019 1955 Inpatient Cardiology Consult Completed           Condition on Discharge:  stable      Discharge Disposition  Home or Self Care    Discharge Medications     Discharge Medications      Changes to Medications      Instructions Start Date   HYDROcodone-acetaminophen 7.5-325 MG per tablet  Commonly known as:  MARYCO  What changed:  Another medication with the same name was added. Make sure you understand how and when to take each.   1 tablet, Oral, 2 Times Daily      HYDROcodone-acetaminophen 7.5-325 MG per tablet  Commonly known as:  CARRIE  What changed:  You were already taking a medication with the same name, and this prescription was added. Make sure you understand how and when to take each.   1 tablet, Oral, Every 4 Hours PRN         Continue These Medications      Instructions Start Date   ALPRAZolam 0.5 MG tablet  Commonly known as:  XANAX   0.5 mg, Oral, Daily PRN      amitriptyline 25 MG tablet  Commonly known as:  ELAVIL   25 mg, Oral, Nightly      aspirin 81 MG chewable tablet   81 mg, Oral, Daily      atorvastatin 20 MG tablet  Commonly known as:  LIPITOR   20 mg, Oral, Nightly      carvedilol 25 MG tablet  Commonly known as:  COREG   25 mg, Oral, 2 Times Daily  With Meals      cholecalciferol 25 MCG (1000 UT) tablet  Commonly known as:  VITAMIN D3   1,000 Units, Oral, Daily      diphenhydrAMINE 50 MG capsule  Commonly known as:  BENADRYL   50 mg, Oral, Nightly PRN      ferrous sulfate 325 (65 FE) MG tablet   325 mg, Oral, Daily With Breakfast      furosemide 20 MG tablet  Commonly known as:  LASIX   20 mg, Oral, 2 Times Daily, For fluid and [facility]       lisinopril 10 MG tablet  Commonly known as:  PRINIVIL,ZESTRIL   10 mg, Oral, Daily      melatonin 5 MG tablet tablet   10 mg, Oral, Nightly PRN      pantoprazole 20 MG EC tablet  Commonly known as:  PROTONIX   20 mg, Oral, 2 Times Daily Before Meals      vitamin B-12 1000 MCG tablet  Commonly known as:  CYANOCOBALAMIN   1,000 mcg, Oral, Daily             Discharge Diet:     Activity at Discharge:   Activity Instructions     Discharge Activity      No driving while on pain medications  No heavy lifting or straining for one week          Follow-up Appointments  No future appointments.  Additional Instructions for the Follow-ups that You Need to Schedule     Discharge Follow-up with Specified Provider: me; 3 Weeks   As directed      To:  me    Follow Up:  3 Weeks               Test Results Pending at Discharge   Order Current Status    Tissue Pathology Exam In process           Karishma Ariza MD  12/19/19  8:02 AM    Time: Time spent at discharge 30 minutes

## 2019-12-19 NOTE — PAYOR COMM NOTE
"SD HOME 12-19-19      Renetta Crawford (69 y.o. Male)     Date of Birth Social Security Number Address Home Phone MRN    1950   [id number]  [place] [address] [phone] [id number]    Zoroastrianism Marital Status          Other        Admission Date Admission Type Admitting Provider Attending Provider Department, Room/Bed    12/16/19 Urgent Karishma Ariza MD  [facility] 3C, 378/1    Discharge Date Discharge Disposition Discharge Destination        12/19/2019 Home or Self Care              Attending Provider:  (none)   Allergies:  No Known Allergies    Isolation:  None   Infection:  None   Code Status:  CPR    Ht:  172.7 cm (67.99\")   Wt:  84.4 kg (186 lb)    Admission Cmt:  None   Principal Problem:  None                Active Insurance as of 12/16/2019     Primary Coverage     Payor Plan Insurance Group Employer/Plan Group    [facility] [id number]      Payor Plan Address Payor Plan Phone Number Payor Plan Fax Number Effective Dates    [employer] 04 [phone]  12/16/2019 - None Entered    [address] [facility] [id number]       Subscriber Name Subscriber Birth Date Member ID       RENETTA CRAWFORD 1950 [id number]                 Emergency Contacts      (Rel.) Home Phone Work Phone Mobile Phone    TOBIASBERNADETTEJUAN JOSE PIERSON (Spouse) [phone] -- --    NIHARIKA CRAWFORD (Relative) [phone] -- --               Discharge Summary      Karishma Ariza MD at 12/19/19 0801          Karishma Ariza MD Discharge Summary    Date of Admission: 12/16/2019  Date of Discharge:  12/19/2019    Discharge Diagnosis: Acute cholecystitis, history of cardiomyopathy and congestive heart failure    Presenting Problem/History of Present Illness    History and Physical as outlined in the chart    Hospital Course  Patient was admitted after undergoing the above operation.  Hospital course postoperatively was uneventful.,  No inflammation was severe so I kept him an extra " night on IV antibiotics patient will be discharged to home.  See medication reconciliation for medications at discharge.    Procedures Performed  Procedure(s):  CHOLECYSTECTOMY, LAPAROSCOPIC       Consults:   Consults     Date and Time Order Name Status Description    12/16/2019 1955 Inpatient Cardiology Consult Completed           Condition on Discharge:  stable      Discharge Disposition  Home or Self Care    Discharge Medications     Discharge Medications      Changes to Medications      Instructions Start Date   HYDROcodone-acetaminophen 7.5-325 MG per tablet  Commonly known as:  NORCO  What changed:  Another medication with the same name was added. Make sure you understand how and when to take each.   1 tablet, Oral, 2 Times Daily      HYDROcodone-acetaminophen 7.5-325 MG per tablet  Commonly known as:  NORCO  What changed:  You were already taking a medication with the same name, and this prescription was added. Make sure you understand how and when to take each.   1 tablet, Oral, Every 4 Hours PRN         Continue These Medications      Instructions Start Date   ALPRAZolam 0.5 MG tablet  Commonly known as:  XANAX   0.5 mg, Oral, Daily PRN      amitriptyline 25 MG tablet  Commonly known as:  ELAVIL   25 mg, Oral, Nightly      aspirin 81 MG chewable tablet   81 mg, Oral, Daily      atorvastatin 20 MG tablet  Commonly known as:  LIPITOR   20 mg, Oral, Nightly      carvedilol 25 MG tablet  Commonly known as:  COREG   25 mg, Oral, 2 Times Daily With Meals      cholecalciferol 25 MCG (1000 UT) tablet  Commonly known as:  VITAMIN D3   1,000 Units, Oral, Daily      diphenhydrAMINE 50 MG capsule  Commonly known as:  BENADRYL   50 mg, Oral, Nightly PRN      ferrous sulfate 325 (65 FE) MG tablet   325 mg, Oral, Daily With Breakfast      furosemide 20 MG tablet  Commonly known as:  LASIX   20 mg, Oral, 2 Times Daily, For fluid and chf [facility]       lisinopril 10 MG tablet  Commonly known as:  PRINIVIL,ZESTRIL    10 mg, Oral, Daily      melatonin 5 MG tablet tablet   10 mg, Oral, Nightly PRN      pantoprazole 20 MG EC tablet  Commonly known as:  PROTONIX   20 mg, Oral, 2 Times Daily Before Meals      vitamin B-12 1000 MCG tablet  Commonly known as:  CYANOCOBALAMIN   1,000 mcg, Oral, Daily             Discharge Diet:     Activity at Discharge:   Activity Instructions     Discharge Activity      No driving while on pain medications  No heavy lifting or straining for one week          Follow-up Appointments  No future appointments.  Additional Instructions for the Follow-ups that You Need to Schedule     Discharge Follow-up with Specified Provider: me; 3 Weeks   As directed      To:  me    Follow Up:  3 Weeks               Test Results Pending at Discharge   Order Current Status    Tissue Pathology Exam In process           Karishma Ariza MD  12/19/19  8:02 AM    Time: Time spent at discharge 30 minutes             Electronically signed by Karishma Ariza MD at 12/19/19 0802

## 2019-12-19 NOTE — DISCHARGE PLACEMENT REQUEST
Discharge Summary      Karishma Ariza MD at 12/19/19 0801          Karishma Ariza MD Discharge Summary    Date of Admission: 12/16/2019  Date of Discharge:  12/19/2019    Discharge Diagnosis: Acute cholecystitis, history of cardiomyopathy and congestive heart failure    Presenting Problem/History of Present Illness    History and Physical as outlined in the chart    Hospital Course  Patient was admitted after undergoing the above operation.  Hospital course postoperatively was uneventful.,  No inflammation was severe so I kept him an extra night on IV antibiotics patient will be discharged to home.  See medication reconciliation for medications at discharge.    Procedures Performed  Procedure(s):  CHOLECYSTECTOMY, LAPAROSCOPIC       Consults:   Consults     Date and Time Order Name Status Description    12/16/2019 1955 Inpatient Cardiology Consult Completed           Condition on Discharge:  stable      Discharge Disposition  Home or Self Care    Discharge Medications     Discharge Medications      Changes to Medications      Instructions Start Date   HYDROcodone-acetaminophen 7.5-325 MG per tablet  Commonly known as:  NORCO  What changed:  Another medication with the same name was added. Make sure you understand how and when to take each.   1 tablet, Oral, 2 Times Daily      HYDROcodone-acetaminophen 7.5-325 MG per tablet  Commonly known as:  NORCO  What changed:  You were already taking a medication with the same name, and this prescription was added. Make sure you understand how and when to take each.   1 tablet, Oral, Every 4 Hours PRN         Continue These Medications      Instructions Start Date   ALPRAZolam 0.5 MG tablet  Commonly known as:  XANAX   0.5 mg, Oral, Daily PRN      amitriptyline 25 MG tablet  Commonly known as:  ELAVIL   25 mg, Oral, Nightly      aspirin 81 MG chewable tablet   81 mg, Oral, Daily      atorvastatin 20 MG tablet  Commonly known as:  LIPITOR   20 mg, Oral, Nightly       carvedilol 25 MG tablet  Commonly known as:  COREG   25 mg, Oral, 2 Times Daily With Meals      cholecalciferol 25 MCG (1000 UT) tablet  Commonly known as:  VITAMIN D3   1,000 Units, Oral, Daily      diphenhydrAMINE 50 MG capsule  Commonly known as:  BENADRYL   50 mg, Oral, Nightly PRN      ferrous sulfate 325 (65 FE) MG tablet   325 mg, Oral, Daily With Breakfast      furosemide 20 MG tablet  Commonly known as:  LASIX   20 mg, Oral, 2 Times Daily, For fluid and chf per [facility] med record       lisinopril 10 MG tablet  Commonly known as:  PRINIVIL,ZESTRIL   10 mg, Oral, Daily      melatonin 5 MG tablet tablet   10 mg, Oral, Nightly PRN      pantoprazole 20 MG EC tablet  Commonly known as:  PROTONIX   20 mg, Oral, 2 Times Daily Before Meals      vitamin B-12 1000 MCG tablet  Commonly known as:  CYANOCOBALAMIN   1,000 mcg, Oral, Daily             Discharge Diet:     Activity at Discharge:   Activity Instructions     Discharge Activity      No driving while on pain medications  No heavy lifting or straining for one week          Follow-up Appointments  No future appointments.  Additional Instructions for the Follow-ups that You Need to Schedule     Discharge Follow-up with Specified Provider: me; 3 Weeks   As directed      To:  me    Follow Up:  3 Weeks               Test Results Pending at Discharge   Order Current Status    Tissue Pathology Exam In process           Karishma Ariza MD  12/19/19  8:02 AM    Time: Time spent at discharge 30 minutes             Electronically signed by Karishma Ariza MD at 12/19/19 0802

## 2019-12-19 NOTE — PROGRESS NOTES
Continued Stay Note  RUDY Barron     Patient Name: Guy Crawford  MRN: [id number]  Today's Date: 12/19/2019    Admit Date: 12/16/2019    Discharge Plan     Row Name 12/19/19 0959       Plan    Plan  Home    Patient/Family in Agreement with Plan  yes    Final Discharge Disposition Code  01 - home or self-care    Final Note  Pt is being d/c'ed home today with no needs. Faxed his d/c summary to the [facility] at [phone].        Discharge Codes    No documentation.       Expected Discharge Date and Time     Expected Discharge Date Expected Discharge Time    Dec 19, 2019             TERRY Rodney

## 2019-12-19 NOTE — PLAN OF CARE
Problem: Patient Care Overview  Goal: Plan of Care Review  Outcome: Ongoing (interventions implemented as appropriate)  Flowsheets  Taken 12/18/2019 0332 by Yana Dubose, RN  Progress: improving  Plan of Care Reviewed With: patient  Taken 12/19/2019 0401 by Rodrigue Manriquez, RN  Outcome Summary: Pt has been medicated with PRN pain medication x 1 thus far this shift. Dsg c/d/i/. Running NS on tele. Safety maintianed. VSS. Will continue to monitor.

## 2019-12-20 LAB
CYTO UR: NORMAL
LAB AP CASE REPORT: NORMAL
PATH REPORT.FINAL DX SPEC: NORMAL
PATH REPORT.GROSS SPEC: NORMAL

## 2023-03-09 ENCOUNTER — TELEPHONE (OUTPATIENT)
Dept: NEUROLOGY | Age: 73
End: 2023-03-09

## 2023-03-09 NOTE — TELEPHONE ENCOUNTER
Received a referral for this patient. Tried calling patient to see about getting patient scheduled an appointment with Dr. Daniel Muñiz. NO answer.

## 2023-04-25 ENCOUNTER — OFFICE VISIT (OUTPATIENT)
Dept: NEUROLOGY | Age: 73
End: 2023-04-25
Payer: OTHER GOVERNMENT

## 2023-04-25 VITALS
HEART RATE: 80 BPM | HEIGHT: 67 IN | WEIGHT: 200 LBS | DIASTOLIC BLOOD PRESSURE: 80 MMHG | BODY MASS INDEX: 31.39 KG/M2 | SYSTOLIC BLOOD PRESSURE: 145 MMHG

## 2023-04-25 DIAGNOSIS — R20.0 NUMBNESS: ICD-10-CM

## 2023-04-25 DIAGNOSIS — G62.9 NEUROPATHY: ICD-10-CM

## 2023-04-25 DIAGNOSIS — Z78.9 ALCOHOL USE: ICD-10-CM

## 2023-04-25 DIAGNOSIS — G62.9 NEUROPATHY: Primary | ICD-10-CM

## 2023-04-25 PROBLEM — F10.90 ALCOHOL USE: Status: ACTIVE | Noted: 2023-04-25

## 2023-04-25 LAB
HBA1C MFR BLD: 5.8 % (ref 4–6)
HIV-1 P24 AG: NORMAL
HIV1+2 AB SERPLBLD QL IA.RAPID: NORMAL
RHEUMATOID FACT SER NEPH-ACNC: <10 IU/ML
TSH SERPL DL<=0.005 MIU/L-ACNC: 1.69 UIU/ML (ref 0.35–5.5)
VIT B12 SERPL-MCNC: 399 PG/ML (ref 211–946)

## 2023-04-25 PROCEDURE — 99204 OFFICE O/P NEW MOD 45 MIN: CPT | Performed by: PSYCHIATRY & NEUROLOGY

## 2023-04-25 PROCEDURE — 1123F ACP DISCUSS/DSCN MKR DOCD: CPT | Performed by: PSYCHIATRY & NEUROLOGY

## 2023-04-25 RX ORDER — ALPRAZOLAM 0.5 MG/1
0.5 TABLET ORAL DAILY PRN
COMMUNITY

## 2023-04-25 RX ORDER — LISINOPRIL 10 MG/1
10 TABLET ORAL DAILY
COMMUNITY

## 2023-04-25 RX ORDER — PANTOPRAZOLE SODIUM 20 MG/1
20 TABLET, DELAYED RELEASE ORAL
COMMUNITY

## 2023-04-25 RX ORDER — AMITRIPTYLINE HYDROCHLORIDE 25 MG/1
25 TABLET, FILM COATED ORAL NIGHTLY
COMMUNITY

## 2023-04-25 RX ORDER — CHOLECALCIFEROL (VITAMIN D3) 125 MCG
10 CAPSULE ORAL NIGHTLY PRN
COMMUNITY

## 2023-04-25 RX ORDER — HYDROCODONE BITARTRATE AND ACETAMINOPHEN 7.5; 325 MG/1; MG/1
1 TABLET ORAL 2 TIMES DAILY
COMMUNITY
Start: 2019-12-19

## 2023-04-25 RX ORDER — CARVEDILOL 25 MG/1
25 TABLET ORAL 2 TIMES DAILY WITH MEALS
COMMUNITY

## 2023-04-25 RX ORDER — ASPIRIN 81 MG/1
81 TABLET, CHEWABLE ORAL DAILY
COMMUNITY

## 2023-04-25 RX ORDER — ATORVASTATIN CALCIUM 20 MG/1
20 TABLET, FILM COATED ORAL NIGHTLY
COMMUNITY

## 2023-04-25 RX ORDER — FUROSEMIDE 20 MG/1
20 TABLET ORAL 2 TIMES DAILY
COMMUNITY

## 2023-04-25 NOTE — PROGRESS NOTES
Review of Systems    Constitutional - No fever or chills. No diaphoresis or significant fatigue. HENT -  No tinnitus or significant hearing loss. Eyes - no sudden vision change or eye pain  Respiratory - yes significant shortness of breath or cough  Cardiovascular - no chest pain No palpitations or significant leg swelling  Gastrointestinal - no abdominal swelling or pain. Genitourinary - No difficulty urinating, dysuria  Musculoskeletal - no back pain or myalgia. Skin - no color change or rash  Neurologic - No seizures. No lateralizing weakness. Hematologic - yes easy bruising or excessive bleeding. Psychiatric no severe anxiety or nervousness. All other review of systems are negative.
HGB, HCT, MCV, PLT  No results found for: NA, K, CL, CO2, BUN, CREATININE, GLUCOSE, CALCIUM, PROT, LABALBU, BILITOT, ALKPHOS, AST, ALT, LABGLOM, GFRAA, AGRATIO, GLOB        Assessment    ICD-10-CM    1. Neuropathy  G62.9 Anti-DNA Antibody, Double-Stranded     Anti-Neutrophilic Cytoplasmic Antibody     B. Burgdorferi Antibodies by WB     Celiac AG IGA/IGG Screen w Reflex     Celiac Reflex Panel     Electrophoresis Protein, Serum with Reflex to Immunofixation     JEYSON 2 - Anti SSA & Anti SSB     Heavy Metal Blood Panel     Hemoglobin A1C     HIV 1 Antibody     TSH with Reflex to FT4     Sedimentation Rate     Rheumatoid Factor     Methylmalonic Acid, Serum     Vitamin B12     Zinc     Lyme (B.Burgdorferi) PCR     Kappa/Lambda Quantitative Free Light Chains, Serum     Lupus (LE) panel w/ reflex      2. Numbness  R20.0       3. Alcohol use  Z78.9           His neurological examination today was significant for reduced sensation up to the mid lower leg, this on the left. There is no evidence of weakness. He had absent reflexes in the lower extremities. Based upon his history and examination his symptoms are most suggestive of a peripheral neuropathy. At this time he will be scheduled for extensive blood work as noted. I indicated that chronic alcohol use can cause neuropathy. He did not wish to initiate any medications for neuropathy as he has no discomfort. The patient indicated understanding of the management plan. He is to follow-up with me on a as needed basis and call with any further problems.     Plan    Orders Placed This Encounter   Procedures    Anti-DNA Antibody, Double-Stranded    Anti-Neutrophilic Cytoplasmic Antibody    B. Burgdorferi Antibodies by WB    Celiac AG IGA/IGG Screen w Reflex    Celiac Reflex Panel    Electrophoresis Protein, Serum with Reflex to Immunofixation    JEYSON 2 - Anti SSA & Anti SSB    Heavy Metal Blood Panel    Hemoglobin A1C    HIV 1 Antibody    TSH with Reflex to FT4

## 2023-04-26 ENCOUNTER — TELEPHONE (OUTPATIENT)
Dept: NEUROLOGY | Age: 73
End: 2023-04-26

## 2023-04-26 NOTE — TELEPHONE ENCOUNTER
Left voicemail for patient asking him to return call to office in regards to he needs to go back to Lab and have blood drawn. A few of the test that were ordered the blood clotted and they need him to have it redrawn.  Per Lab at [facility]

## 2023-04-27 NOTE — TELEPHONE ENCOUNTER
Dmitriy Graves returned call and I have explained to him that Lab is needing him to come back in re-draw blood. Patient lives an hour away and I told him whenever he can get here to do it is fine, and I apologized that he was having to come back. I asked that he come before 3:00 to lab. Dmitriy Star voiced understanding and would come one day and have a redraw.

## 2023-04-28 LAB
B BURGDOR IGG SER QL IB: NEGATIVE
B BURGDOR IGM SER QL IB: NEGATIVE
C3 SERPL-MCNC: 147 MG/DL (ref 90–180)
C4 SERPL-MCNC: 27 MG/DL (ref 10–40)
GLIADIN PEPTIDE+TTG IGA+IGG SER QL IA: 11 UNITS (ref 0–19)
MYELOPEROXIDASE AB SER-ACNC: 0 AU/ML (ref 0–19)
NUCLEAR IGG SER QL IA: NORMAL
PROTEINASE3 AB SER-ACNC: 0 AU/ML (ref 0–19)
RHEUMATOID FACT SER NEPH-ACNC: <10 IU/ML (ref 0–14)
ZINC SERPL-MCNC: 69 UG/DL (ref 60–120)

## 2023-04-29 LAB
ALBUMIN SERPL-MCNC: 4.35 G/DL (ref 3.75–5.01)
ALPHA1 GLOB SERPL ELPH-MCNC: 0.33 G/DL (ref 0.19–0.46)
ALPHA2 GLOB SERPL ELPH-MCNC: 0.7 G/DL (ref 0.48–1.05)
B BURGDOR DNA SPEC QL NAA+PROBE: NOT DETECTED
B-GLOBULIN SERPL ELPH-MCNC: 0.88 G/DL (ref 0.48–1.1)
DEPRECATED KAPPA LC FREE/LAMBDA SER: 1.05 {RATIO} (ref 0.26–1.65)
GAMMA GLOB SERPL ELPH-MCNC: 1.13 G/DL (ref 0.62–1.51)
IGA SERPL-MCNC: 280 MG/DL (ref 68–408)
IGG SERPL-MCNC: 1062 MG/DL (ref 768–1632)
IGM SERPL-MCNC: 77 MG/DL (ref 35–263)
INTERPRETATION SERPL IFE-IMP: ABNORMAL
INTERPRETATION SERPL IFE-IMP: ABNORMAL
KAPPA LC FREE SER-MCNC: 24.19 MG/L (ref 3.3–19.4)
LAMBDA LC FREE SERPL-MCNC: 22.99 MG/L (ref 5.71–26.3)
METHYLMALONATE SERPL-SCNC: 0.43 UMOL/L (ref 0–0.4)
PROT SERPL-MCNC: 7.4 G/DL (ref 6.3–8.2)
SPECIMEN SOURCE: NORMAL

## 2023-04-30 DIAGNOSIS — D89.89 LIGHT CHAIN DISEASE (HCC): Primary | ICD-10-CM

## 2023-04-30 LAB
DSDNA IGG SER QL IA: 3.6 IU/ML
GLIADIN IGG SER IA-ACNC: 2 U/ML
IGA SERPL-MCNC: 1.5 U/ML
IGA SERPL-MCNC: 288 MG/DL (ref 70–400)
TTG IGA SER IA-ACNC: 0.6 U/ML

## 2023-05-01 LAB
HIV-1 P24 AG: NORMAL
HIV1+2 AB SERPLBLD QL IA.RAPID: NORMAL

## 2023-05-03 LAB
ARSENIC BLD-MCNC: <10 UG/L
CADMIUM BLD-MCNC: <1 UG/L
LEAD BLD-MCNC: 2.2 UG/DL
MERCURY BLD-MCNC: <2.5 UG/L

## 2023-05-04 LAB
B BURGDOR DNA SPEC QL NAA+PROBE: NOT DETECTED
SPECIMEN SOURCE: NORMAL

## 2023-06-05 ENCOUNTER — OFFICE VISIT (OUTPATIENT)
Dept: HEMATOLOGY | Age: 73
End: 2023-06-05
Payer: OTHER GOVERNMENT

## 2023-06-05 ENCOUNTER — HOSPITAL ENCOUNTER (OUTPATIENT)
Dept: INFUSION THERAPY | Age: 73
Discharge: HOME | End: 2023-06-05
Payer: OTHER GOVERNMENT

## 2023-06-05 VITALS
WEIGHT: 200.6 LBS | HEART RATE: 80 BPM | DIASTOLIC BLOOD PRESSURE: 76 MMHG | SYSTOLIC BLOOD PRESSURE: 124 MMHG | BODY MASS INDEX: 31.48 KG/M2 | OXYGEN SATURATION: 97 % | HEIGHT: 67 IN

## 2023-06-05 DIAGNOSIS — R76.8 ELEVATED SERUM IMMUNOGLOBULIN FREE LIGHT CHAINS: Primary | ICD-10-CM

## 2023-06-05 DIAGNOSIS — R76.8 ELEVATED SERUM IMMUNOGLOBULIN FREE LIGHT CHAINS: ICD-10-CM

## 2023-06-05 DIAGNOSIS — C64.2 CLEAR CELL CARCINOMA OF LEFT KIDNEY (HCC): ICD-10-CM

## 2023-06-05 DIAGNOSIS — Z78.9 ALCOHOL USE: ICD-10-CM

## 2023-06-05 DIAGNOSIS — G62.9 NEUROPATHY: ICD-10-CM

## 2023-06-05 LAB
ALBUMIN SERPL-MCNC: 4.4 G/DL (ref 3.5–5.2)
ALP SERPL-CCNC: 129 U/L (ref 40–130)
ALT SERPL-CCNC: 26 U/L (ref 5–41)
ANION GAP SERPL CALCULATED.3IONS-SCNC: 12 MMOL/L (ref 7–19)
AST SERPL-CCNC: 19 U/L (ref 5–40)
BASOPHILS # BLD: 0.05 K/UL (ref 0.01–0.08)
BASOPHILS NFR BLD: 0.7 % (ref 0.1–1.2)
BILIRUB SERPL-MCNC: 0.7 MG/DL (ref 0.2–1.2)
BUN SERPL-MCNC: 14 MG/DL (ref 8–23)
CALCIUM SERPL-MCNC: 9.5 MG/DL (ref 8.8–10.2)
CHLORIDE SERPL-SCNC: 100 MMOL/L (ref 98–111)
CO2 SERPL-SCNC: 28 MMOL/L (ref 22–29)
CREAT SERPL-MCNC: 1.2 MG/DL (ref 0.5–1.2)
EOSINOPHIL # BLD: 0.13 K/UL (ref 0.04–0.54)
EOSINOPHIL NFR BLD: 1.8 % (ref 0.7–7)
ERYTHROCYTE [DISTWIDTH] IN BLOOD BY AUTOMATED COUNT: 13.7 % (ref 11.6–14.4)
GLUCOSE SERPL-MCNC: 115 MG/DL (ref 74–109)
HCT VFR BLD AUTO: 44.2 % (ref 40.1–51)
HGB BLD-MCNC: 13.8 G/DL (ref 13.7–17.5)
LDH SERPL-CCNC: 147 U/L (ref 91–215)
LYMPHOCYTES # BLD: 2.05 K/UL (ref 1.18–3.74)
LYMPHOCYTES NFR BLD: 28.8 % (ref 19.3–53.1)
MCH RBC QN AUTO: 31.1 PG (ref 25.7–32.2)
MCHC RBC AUTO-ENTMCNC: 31.2 G/DL (ref 32.3–36.5)
MCV RBC AUTO: 99.5 FL (ref 79–92.2)
MONOCYTES # BLD: 0.6 K/UL (ref 0.24–0.82)
MONOCYTES NFR BLD: 8.4 % (ref 4.7–12.5)
NEUTROPHILS # BLD: 4.27 K/UL (ref 1.56–6.13)
NEUTS SEG NFR BLD: 59.9 % (ref 34–71.1)
PLATELET # BLD AUTO: 170 K/UL (ref 163–337)
PMV BLD AUTO: 11.5 FL (ref 7.4–10.4)
POTASSIUM SERPL-SCNC: 4.9 MMOL/L (ref 3.5–5)
PROT SERPL-MCNC: 6.9 G/DL (ref 6.6–8.7)
RBC # BLD AUTO: 4.44 M/UL (ref 4.63–6.08)
SODIUM SERPL-SCNC: 140 MMOL/L (ref 136–145)
WBC # BLD AUTO: 7.13 K/UL (ref 4.23–9.07)

## 2023-06-05 PROCEDURE — 99212 OFFICE O/P EST SF 10 MIN: CPT

## 2023-06-05 PROCEDURE — 36415 COLL VENOUS BLD VENIPUNCTURE: CPT | Performed by: NURSE PRACTITIONER

## 2023-06-05 PROCEDURE — 85025 COMPLETE CBC W/AUTO DIFF WBC: CPT

## 2023-06-05 PROCEDURE — 36415 COLL VENOUS BLD VENIPUNCTURE: CPT

## 2023-06-05 PROCEDURE — 99205 OFFICE O/P NEW HI 60 MIN: CPT | Performed by: NURSE PRACTITIONER

## 2023-06-05 PROCEDURE — 1123F ACP DISCUSS/DSCN MKR DOCD: CPT | Performed by: NURSE PRACTITIONER

## 2023-06-05 ASSESSMENT — ENCOUNTER SYMPTOMS
NAUSEA: 0
WHEEZING: 0
ABDOMINAL PAIN: 0
CONSTIPATION: 0
COUGH: 0
EYE PAIN: 0
EYE REDNESS: 0
VOMITING: 0
SHORTNESS OF BREATH: 1
DIARRHEA: 0
GASTROINTESTINAL NEGATIVE: 1
SORE THROAT: 0
BLOOD IN STOOL: 0
BACK PAIN: 1
EYE DISCHARGE: 0

## 2023-06-05 ASSESSMENT — PROMIS GLOBAL HEALTH SCALE
IN GENERAL, PLEASE RATE HOW WELL YOU CARRY OUT YOUR USUAL SOCIAL ACTIVITIES (INCLUDES ACTIVITIES AT HOME, AT WORK, AND IN YOUR COMMUNITY, AND RESPONSIBILITIES AS A PARENT, CHILD, SPOUSE, EMPLOYEE, FRIEND, ETC) [ON A SCALE OF 1 (POOR) TO 5 (EXCELLENT)]?: 3
IN GENERAL, WOULD YOU SAY YOUR QUALITY OF LIFE IS...[ON A SCALE OF 1 (POOR) TO 5 (EXCELLENT)]: 4
IN GENERAL, WOULD YOU SAY YOUR HEALTH IS...[ON A SCALE OF 1 (POOR) TO 5 (EXCELLENT)]: 3
IN THE PAST 7 DAYS, HOW WOULD YOU RATE YOUR PAIN ON AVERAGE [ON A SCALE FROM 0 (NO PAIN) TO 10 (WORST IMAGINABLE PAIN)]?: 0
IN GENERAL, HOW WOULD YOU RATE YOUR MENTAL HEALTH, INCLUDING YOUR MOOD AND YOUR ABILITY TO THINK [ON A SCALE OF 1 (POOR) TO 5 (EXCELLENT)]?: 2
TO WHAT EXTENT ARE YOU ABLE TO CARRY OUT YOUR EVERYDAY PHYSICAL ACTIVITIES SUCH AS WALKING, CLIMBING STAIRS, CARRYING GROCERIES, OR MOVING A CHAIR [ON A SCALE OF 1 (NOT AT ALL) TO 5 (COMPLETELY)]?: 5
IN THE PAST 7 DAYS, HOW OFTEN HAVE YOU BEEN BOTHERED BY EMOTIONAL PROBLEMS, SUCH AS FEELING ANXIOUS, DEPRESSED, OR IRRITABLE [ON A SCALE FROM 1 (NEVER) TO 5 (ALWAYS)]?: 3
SUM OF RESPONSES TO QUESTIONS 3, 6, 7, & 8: 10
SUM OF RESPONSES TO QUESTIONS 2, 4, 5, & 10: 12
IN THE PAST 7 DAYS, HOW WOULD YOU RATE YOUR FATIGUE ON AVERAGE [ON A SCALE FROM 1 (NONE) TO 5 (VERY SEVERE)]?: 3
IN GENERAL, HOW WOULD YOU RATE YOUR SATISFACTION WITH YOUR SOCIAL ACTIVITIES AND RELATIONSHIPS [ON A SCALE OF 1 (POOR) TO 5 (EXCELLENT)]?: 3
IN GENERAL, HOW WOULD YOU RATE YOUR PHYSICAL HEALTH [ON A SCALE OF 1 (POOR) TO 5 (EXCELLENT)]?: 2

## 2023-06-05 NOTE — PROGRESS NOTES
Thought content normal.       Labs reviewed today:  Lab Results   Component Value Date    WBC 7.13 06/05/2023    HGB 13.8 06/05/2023    HCT 44.2 06/05/2023    MCV 99.5 (H) 06/05/2023     06/05/2023     Lab Results   Component Value Date    NEUTROABS 4.27 06/05/2023       ASSESSMENT/PLAN:      1. Elevated serum immunoglobulin free light chains with a normal ratio, with a normal SPEP pattern and no monoclonal protein. He has no known history of hypercalcemia, anemia or chronic kidney disease. Obtain the following serology studies and 24-hour urine  - Beta 2 Microglobulin, Serum; Future  - MONOCLONAL PROTEIN AND FLC, SERUM; Future  - Comprehensive Metabolic Panel; Future  - Lactate Dehydrogenase; Future  - MYA+Protein Electrophoresis, 24 Hour Urine; Future    2. Clear cell carcinoma of left kidney ([facility] [address]  pT1b, pNX, 07/05/2012: Status post left renal mass, nephrectomy ([facility]-Dr Chip Villavicencio nuclear grade 2, confined to the kidney. Tumor measures 4.6 cm in greatest dimension. Margins of resection are negative (renal vein, artery, ureteral and perinephric soft tissue). Denies any known history of recurrent/progressive disease. Reports has had CT scans completed through the [facility].    -Will request medical records from [facility]    3. Peripheral neuropathy, present for at least 3 years. Left being worse than the right.    -Follow-up with Dr. Anne Valerio as recommended    4. Alcohol use, reports currently drinking 6-8 beers nightly for the past 50+ years  We talked about the importance of quitting the use of alcohol for approximately 4-5 minutes. Specifically, we discussed the risk related alcohol including but not limited to oral and esophageal carcinomas, liver disease, poor nutritional benefit, risk for injury due to potential toxicity and financial loss. I advised to quit use of alcohol and encouraged attending [facility] meetings. . The patient is contemplated at this time.  I will follow-up on alcohol cessation during the

## 2023-06-07 LAB — B2 MICROGLOB SERPL-MCNC: 3.3 MG/L

## 2023-06-08 LAB
ALBUMIN SERPL-MCNC: 4.08 G/DL (ref 3.75–5.01)
ALPHA1 GLOB SERPL ELPH-MCNC: 0.36 G/DL (ref 0.19–0.46)
ALPHA2 GLOB SERPL ELPH-MCNC: 0.67 G/DL (ref 0.48–1.05)
B-GLOBULIN SERPL ELPH-MCNC: 0.85 G/DL (ref 0.48–1.1)
DEPRECATED KAPPA LC FREE/LAMBDA SER: 1.06 {RATIO} (ref 0.26–1.65)
GAMMA GLOB SERPL ELPH-MCNC: 1.04 G/DL (ref 0.62–1.51)
IGA SERPL-MCNC: 252 MG/DL (ref 68–408)
IGG SERPL-MCNC: 954 MG/DL (ref 768–1632)
IGM SERPL-MCNC: 82 MG/DL (ref 35–263)
INTERPRETATION SERPL IFE-IMP: ABNORMAL
INTERPRETATION SERPL IFE-IMP: ABNORMAL
KAPPA LC FREE SER-MCNC: 24.95 MG/L (ref 3.3–19.4)
LAMBDA LC FREE SERPL-MCNC: 23.64 MG/L (ref 5.71–26.3)
PROT SERPL-MCNC: 7 G/DL (ref 6.3–8.2)

## 2023-06-12 DIAGNOSIS — R76.8 ELEVATED SERUM IMMUNOGLOBULIN FREE LIGHT CHAINS: ICD-10-CM

## 2023-06-16 LAB
ALBUMIN ?TM MFR UR ELPH: 100 %
ALPHA1 GLOB ?TM MFR UR ELPH: 0 %
ALPHA2 GLOB ?TM MFR UR ELPH: 0 %
B-GLOBULIN ?TM MFR UR ELPH: 0 %
COLLECT DURATION TIME SPEC: 24 H
GAMMA GLOB ?TM MFR UR ELPH: 0 %
INTERPRETATION UR IFE-IMP: ABNORMAL
M PROTEIN 24H MFR UR ELPH: 0 %
M PROTEIN 24H UR ELPH-MRATE: 0 MG/24 HRS
PATHOLOGY STUDY: ABNORMAL
PROT 24H UR-MRATE: 256 MG/D (ref 40–150)
PROT UR-MCNC: 8 MG/DL
SPECIMEN VOL ?TM UR: 3200 ML

## 2023-07-18 NOTE — PROGRESS NOTES
electronic translation of spoken language may permit erroneous, or at times, nonsensical words or phrases to be inadvertently transcribed; although attempts have made to review the note for such errors, some may still exist.  Please excuse any unrecognized transcription errors and contact us if the error is unintelligible or needs documented correction. Also, portions of this note have been copied forward, however, changed to reflect the most current clinical status of this patient. Electronically signed by JANELLE Grijalva on 8/6/2023 at 5:24 PM  Nahomy ROSS, am starting this note as a registered nurse for JANELLE Kiran.

## 2023-08-02 ENCOUNTER — OFFICE VISIT (OUTPATIENT)
Dept: HEMATOLOGY | Age: 73
End: 2023-08-02
Payer: OTHER GOVERNMENT

## 2023-08-02 ENCOUNTER — HOSPITAL ENCOUNTER (OUTPATIENT)
Dept: INFUSION THERAPY | Age: 73
Discharge: HOME | End: 2023-08-02
Payer: OTHER GOVERNMENT

## 2023-08-02 VITALS
OXYGEN SATURATION: 96 % | HEART RATE: 77 BPM | WEIGHT: 198 LBS | DIASTOLIC BLOOD PRESSURE: 60 MMHG | BODY MASS INDEX: 31.01 KG/M2 | SYSTOLIC BLOOD PRESSURE: 125 MMHG

## 2023-08-02 DIAGNOSIS — G62.9 NEUROPATHY: ICD-10-CM

## 2023-08-02 DIAGNOSIS — C64.2 CLEAR CELL CARCINOMA OF LEFT KIDNEY (HCC): ICD-10-CM

## 2023-08-02 DIAGNOSIS — R76.8 ELEVATED SERUM IMMUNOGLOBULIN FREE LIGHT CHAINS: Primary | ICD-10-CM

## 2023-08-02 DIAGNOSIS — R76.8 ELEVATED SERUM IMMUNOGLOBULIN FREE LIGHT CHAINS: ICD-10-CM

## 2023-08-02 DIAGNOSIS — Z78.9 ALCOHOL USE: ICD-10-CM

## 2023-08-02 LAB
ERYTHROCYTE [DISTWIDTH] IN BLOOD BY AUTOMATED COUNT: 13.7 % (ref 11.6–14.4)
HCT VFR BLD AUTO: 41.6 % (ref 40.1–51)
HGB BLD-MCNC: 14.2 G/DL (ref 13.7–17.5)
LYMPHOCYTES # BLD: 2.09 K/UL (ref 1.18–3.74)
LYMPHOCYTES NFR BLD: 27.6 % (ref 19.3–53.1)
MCH RBC QN AUTO: 31 PG (ref 25.7–32.2)
MCHC RBC AUTO-ENTMCNC: 34.1 G/DL (ref 32.3–36.5)
MCV RBC AUTO: 90.8 FL (ref 79–92.2)
MONOCYTES # BLD: 0.64 K/UL (ref 0.24–0.82)
MONOCYTES NFR BLD: 8.4 % (ref 4.7–12.5)
NEUTROPHILS # BLD: 4.6 K/UL (ref 1.56–6.13)
NEUTS SEG NFR BLD: 60.7 % (ref 34–71.1)
PLATELET # BLD AUTO: 176 K/UL (ref 163–337)
PMV BLD AUTO: 11.2 FL (ref 7.4–10.4)
RBC # BLD AUTO: 4.58 M/UL (ref 4.63–6.08)
WBC # BLD AUTO: 7.58 K/UL (ref 4.23–9.07)

## 2023-08-02 PROCEDURE — 1123F ACP DISCUSS/DSCN MKR DOCD: CPT | Performed by: NURSE PRACTITIONER

## 2023-08-02 PROCEDURE — 99211 OFF/OP EST MAY X REQ PHY/QHP: CPT

## 2023-08-02 PROCEDURE — 99212 OFFICE O/P EST SF 10 MIN: CPT | Performed by: NURSE PRACTITIONER

## 2023-08-02 PROCEDURE — 36415 COLL VENOUS BLD VENIPUNCTURE: CPT

## 2023-08-02 PROCEDURE — 85025 COMPLETE CBC W/AUTO DIFF WBC: CPT

## 2023-08-06 ASSESSMENT — ENCOUNTER SYMPTOMS
SHORTNESS OF BREATH: 0
EYE DISCHARGE: 0
GASTROINTESTINAL NEGATIVE: 1
NAUSEA: 0
CONSTIPATION: 0
BACK PAIN: 0
COUGH: 0
EYES NEGATIVE: 1
SORE THROAT: 0
RESPIRATORY NEGATIVE: 1
DIARRHEA: 0
EYE PAIN: 0
ABDOMINAL PAIN: 0
BLOOD IN STOOL: 0
VOMITING: 0
EYE REDNESS: 0
WHEEZING: 0

## 2023-10-02 RX ORDER — ALPRAZOLAM 0.5 MG/1
0.5 TABLET ORAL 2 TIMES DAILY PRN
COMMUNITY

## 2023-10-02 RX ORDER — HYDROCODONE BITARTRATE AND ACETAMINOPHEN 10; 325 MG/1; MG/1
1 TABLET ORAL EVERY 6 HOURS PRN
COMMUNITY

## 2023-10-02 RX ORDER — ALLOPURINOL 300 MG/1
300 TABLET ORAL DAILY
COMMUNITY

## 2023-10-02 RX ORDER — LANOLIN ALCOHOL/MO/W.PET/CERES
1000 CREAM (GRAM) TOPICAL DAILY
COMMUNITY

## 2023-10-02 RX ORDER — LORATADINE 10 MG/1
10 CAPSULE, LIQUID FILLED ORAL
COMMUNITY

## 2023-10-03 ENCOUNTER — OFFICE VISIT (OUTPATIENT)
Dept: CARDIOLOGY | Facility: CLINIC | Age: 73
End: 2023-10-03
Payer: OTHER GOVERNMENT

## 2023-10-03 ENCOUNTER — CLINICAL SUPPORT NO REQUIREMENTS (OUTPATIENT)
Dept: CARDIOLOGY | Facility: CLINIC | Age: 73
End: 2023-10-03
Payer: OTHER GOVERNMENT

## 2023-10-03 DIAGNOSIS — Z45.02 IMPLANTABLE CARDIOVERTER-DEFIBRILLATOR (ICD) GENERATOR END OF LIFE: Primary | ICD-10-CM

## 2023-10-03 DIAGNOSIS — E78.2 MIXED HYPERLIPIDEMIA: ICD-10-CM

## 2023-10-03 DIAGNOSIS — I10 PRIMARY HYPERTENSION: ICD-10-CM

## 2023-10-03 DIAGNOSIS — F10.10 ALCOHOL ABUSE: ICD-10-CM

## 2023-10-03 DIAGNOSIS — I50.42 CHRONIC COMBINED SYSTOLIC AND DIASTOLIC CONGESTIVE HEART FAILURE: ICD-10-CM

## 2023-10-03 DIAGNOSIS — F17.201 TOBACCO ABUSE, IN REMISSION: ICD-10-CM

## 2023-10-03 DIAGNOSIS — I25.10 CORONARY ARTERY DISEASE INVOLVING NATIVE CORONARY ARTERY OF NATIVE HEART WITHOUT ANGINA PECTORIS: ICD-10-CM

## 2023-10-03 DIAGNOSIS — I50.22 CHRONIC SYSTOLIC CONGESTIVE HEART FAILURE: ICD-10-CM

## 2023-10-03 PROBLEM — I50.9 CHF (CONGESTIVE HEART FAILURE): Status: ACTIVE | Noted: 2023-10-03

## 2023-10-03 PROBLEM — E78.5 HLD (HYPERLIPIDEMIA): Status: ACTIVE | Noted: 2023-10-03

## 2023-10-03 NOTE — ASSESSMENT & PLAN NOTE
Chronic systolic/diastolic. Not on GDMT. Change lisinopril to Entresto 24/26 mg twice a day after 36 hour washout period. Provided samples #28 tablets and written instructions. Discussed risk of possibly life threatening angioedema if takes both medications within 36 hours. Will consider addition of Jardiance or Farxiga. Will consider addition of aldosterone antagonist if BP allows. Continue carvedilol.

## 2023-10-03 NOTE — ASSESSMENT & PLAN NOTE
Stable without angina but fairly sedentary. Will consider stress testing especially if any worsening.

## 2023-10-03 NOTE — ASSESSMENT & PLAN NOTE
Well controlled with HF medications. Changing lisinopril to Entresto to decrease risk of hospitalization and decrease mortality.

## 2023-10-03 NOTE — ASSESSMENT & PLAN NOTE
Fair control per 10/22 labs on atorvastatin. Continue present therapy per now. May consider increasing or adding Zetia if not to goal with upcoming yearly labs.

## 2023-10-03 NOTE — ASSESSMENT & PLAN NOTE
Encouraged him to decrease alcohol intake to no more than 2 beers per day. He is not interested in cutting back.

## 2023-10-03 NOTE — PROGRESS NOTES
Encounter Date:10/03/2023  Chief Complaint:   Subjective    Guy Crawford is a 73 y.o. male who presents to [facility] CARDIOLOG[facility] today for cardiac evaluation and needs ICD generator replaced. [facility] but [facility]. He is a poor historian. He is accompanied by his nephew who lives in [place]. He lives with his wife.      Congestive Heart Failure  His past medical history is significant for CAD.   Hypertension    Hyperlipidemia    Coronary Artery Disease  Risk factors include hyperlipidemia. His past medical history is significant for CHF.    HPI       Congestive Heart Failure     Additional comments: Chronic systolic. Last echo 2019 with EF 45%. SOA with normal activity. Sedentary. No orthopnea, PND, or sudden weight gain. A little swelling in Left foot for the last 3 years - hx SVG?             Hypertension     Additional comments: Has been good for the last few years. Doesn't check at home. Wife has BP monitor.              Hyperlipidemia     Additional comments: Fair control [facility]. No myalgias on atorvastatin.             Coronary Artery Disease     Additional comments: No chest discomfort but not very active. Hx CABG Dr. Rucker.              PACEMAKER     Additional comments: Defibrillator tripped JOSE ANTONIO 9/13/23. Medtronic Protecta. Wants to have gen change at [facility]. Complains of dizziness since spreading manure in a [place].             NEW PATIENT     Additional comments: [facility] - previously followed by Dr. Rodriguez with ICD generator and leads removed 1/2014 due to pocket erosion and replaced 2/2014.          Last edited by Meredith Catalan APRN on 10/3/2023  3:20 PM.        Past Medical History:   Diagnosis Date    Alcohol dependence     Anxiety     CAD (coronary artery disease)     Cardiomyopathy     CHF (congestive heart failure)     GERD (gastroesophageal reflux disease)     Gout     Hard to intubate     [facility] D blade     Hearing loss     HLD (hyperlipidemia)     HTN (hypertension)     Insomnia     Ischemic heart disease     Pacemaker     Renal cell carcinoma     Tachycardia      Past Surgical History:   Procedure Laterality Date    CARDIAC CATHETERIZATION      CARDIAC DEFIBRILLATOR PLACEMENT  2014    [facility], Dr. Rodriguez    CARDIAC DEFIBRILLATOR REMOVAL  2014    CHOLECYSTECTOMY WITH INTRAOPERATIVE CHOLANGIOGRAM N/A 2019    Procedure: CHOLECYSTECTOMY, LAPAROSCOPIC;  Surgeon: Karishma Ariza MD;  Location: [facility];  Service: General    CORONARY ARTERY BYPASS GRAFT      Dr. Rucker, [facility]    KIDNEY SURGERY       History reviewed. No pertinent family history.  Social History     Socioeconomic History    Marital status:    Tobacco Use    Smoking status: Former     Packs/day: 1.00     Years: 40.00     Pack years: 40.00     Types: Cigarettes     Start date:      Quit date: 2011     Years since quittin.8    Smokeless tobacco: Never    Tobacco comments:     ONLY SMOKED AND DRANK ALCOHOL ON THE WEEKEND   Vaping Use    Vaping Use: Never used   Substance and Sexual Activity    Alcohol use: Yes     Alcohol/week: 42.0 standard drinks     Types: 42 Cans of beer per week     Comment: 6 PACK A DAY    Drug use: Never    Sexual activity: Defer     History: Past medical, surgical, family, and social history reviewed.    Outpatient Medications Marked as Taking for the 10/3/23 encounter (Office Visit) with Meredith Catalan APRN   Medication Sig Dispense Refill    allopurinol (ZYLOPRIM) 300 MG tablet Take 1 tablet by mouth Daily.      ALPRAZolam (XANAX) 0.5 MG tablet Take 1 tablet by mouth 2 (Two) Times a Day As Needed for Anxiety.      aspirin 81 MG chewable tablet Chew 1 tablet Daily.      atorvastatin (LIPITOR) 40 MG tablet Take 0.5 tablets by mouth Every Night.      carvedilol (COREG) 25 MG tablet Take 1 tablet by mouth 2 (Two) Times a Day With Meals.      cholecalciferol (VITAMIN D3) 25  "MCG (1000 UT) tablet Take 1 tablet by mouth Daily.      Flaxseed, Linseed, (FLAX SEED OIL PO) Take  by mouth.      furosemide (LASIX) 20 MG tablet Take 1 tablet by mouth 2 (Two) Times a Day. [facility] fluid and [facility]      HYDROcodone-acetaminophen (NORCO)  MG per tablet Take 1 tablet by mouth Every 6 (Six) Hours As Needed for Moderate Pain.      Loratadine 10 MG capsule Take 1 capsule by mouth. CLARITIN      melatonin 5 MG tablet tablet Take 2 tablets by mouth At Night As Needed.      vitamin B-12 (CYANOCOBALAMIN) 1000 MCG tablet Take 1 tablet by mouth Daily.      [DISCONTINUED] lisinopril (PRINIVIL,ZESTRIL) 10 MG tablet Take 1 tablet by mouth Daily.          Objective     Vital Signs:   /74 (BP Location: Left arm, Patient Position: Sitting, Cuff Size: Adult)   Pulse (!) 43   Ht 170.2 cm (67\")   Wt 90.4 kg (199 lb 3.2 oz)   SpO2 98%   BMI 31.20 kg/m²   Wt Readings from Last 3 Encounters:   10/03/23 90.4 kg (199 lb 3.2 oz)   12/17/19 84.4 kg (186 lb)         Vitals reviewed.   Constitutional:       Appearance: Well-developed and not in distress. Chronically ill-appearing.   Eyes:      General: No scleral icterus.  Neck:      Vascular: No JVD.   Pulmonary:      Effort: Pulmonary effort is normal.      Breath sounds: Normal breath sounds.   Cardiovascular:      Bradycardia present. Regular rhythm.      No gallop.    Pulses:     Intact distal pulses.   Edema:     Pretibial: 1+ pitting edema of the left pretibial area and 2+ pitting edema of the right pretibial area.     Ankle: trace pitting edema of the left ankle and 1+ pitting edema of the right ankle.  Skin:     General: Skin is warm and dry.   Neurological:      Mental Status: Alert, oriented to person, place, and time and oriented to person, place and time.       Result Review  Data Reviewed:  The following data was reviewed by: LESLEY Stanford on 10/03/2023  EXTERNAL MEDICAL RECORDS - SCAN - EXT MED RECS - [facility] - " 09/26/23 (09/26/2023)   Lab Results - Last 18 Months   Lab Units 08/02/23  1210 06/05/23  1512 04/25/23  1208   HEMOGLOBIN A1C %  --   --  5.8   WBC K/uL 7.58 7.13  --    RBC M/uL 4.58* 4.44*  --    HEMATOCRIT % 41.6 44.2  --    MCV fL 90.8 99.5*  --    MCH pg 31.0 31.1  --    LABORATORY - SCAN - BMP/LIPID/ [facility]/ 10- (10/21/2022)   STAT Adult Transthoracic Echo Complete W/ Cont if Necessary Per Protocol (12/17/2019 13:55)          ECG 12 Lead    Date/Time: 10/4/2023 12:47 PM  Performed by: Meredith Catalan APRN  Authorized by: Meredith Catalan APRN   Comparison: compared with previous ECG from 2/21/2014  Comparison to previous ECG: PVCs are no longer present  Rhythm: sinus rhythm  Rate: normal  BPM: 76  Conduction: non-specific intraventricular conduction delay    Clinical impression: abnormal EKG           Assessment and Plan   Problem List Items Addressed This Visit          Cardiac and Vasculature    CAD (coronary artery disease)    Overview     S/p CABG 2003         Current Assessment & Plan     Stable without angina but fairly sedentary. Will consider stress testing especially if any worsening.         Relevant Medications    sacubitril-valsartan (ENTRESTO) 24-26 MG tablet    CHF (congestive heart failure)    Current Assessment & Plan     Chronic systolic/diastolic. Not on GDMT. Change lisinopril to Entresto 24/26 mg twice a day after 36 hour washout period. Provided samples #28 tablets and written instructions. Discussed risk of possibly life threatening angioedema if takes both medications within 36 hours. Will consider addition of Jardiance or Farxiga. Will consider addition of aldosterone antagonist if BP allows. Continue carvedilol.         Relevant Medications    sacubitril-valsartan (ENTRESTO) 24-26 MG tablet    Other Relevant Orders    Ambulatory Referral to General Surgery (Completed)    Implantable cardioverter-defibrillator (ICD) generator end of life - Primary    Relevant  Orders    Ambulatory Referral to General Surgery (Completed)    HLD (hyperlipidemia)    Current Assessment & Plan     Fair control per 10/22 labs on atorvastatin. Continue present therapy per now. May consider increasing or adding Zetia if not to goal with upcoming yearly labs.          HTN (hypertension)    Current Assessment & Plan     Well controlled with HF medications. Changing lisinopril to Entresto to decrease risk of hospitalization and decrease mortality.            Mental Health    Alcohol abuse    Current Assessment & Plan     Encouraged him to decrease alcohol intake to no more than 2 beers per day. He is not interested in cutting back.            Tobacco    Tobacco abuse, in remission    Current Assessment & Plan     Encouraged continued cessation.          Patient was given instructions and counseling regarding his condition or for health maintenance advice. Please see specific information pulled into the AVS if appropriate.    Follow Up :   Return in about 2 months (around 12/3/2023) for Recheck.       Time Spent: I spent 52 minutes caring for Guy on this date of service. This time includes time spent by me in the following activities: reviewing tests, obtaining and/or reviewing a separately obtained history, performing a medically appropriate examination and/or evaluation, counseling and educating the patient/family/caregiver, ordering medications, tests, or procedures, and documenting information in the medical record.     I spent 1 minute on the separately reported service of EKG. This time is not included in the time used to support the E/M service also reported today.        LESLEY Riggs, ACNP-BC, CHFN-BC

## 2023-10-03 NOTE — PROGRESS NOTES
Medtronic AICD Evaluation Report    October 3, 2023    Indication for AICD: Primary Prevention of Sudden Cardiac Death    Implanting MD: Dr. Rodriguez    : Medtronic  Model: Protecta    Implant Date: 2/21/2014    Reason For Evaluation: JOSE ANTONIO/transfer care      DIAGNOSTIC DATA    Atrial paced: n/a% Ventricular paced: Less than 0.1%    Battery status: elective replacement time  Voltage: 2.59 V  Estimated battery life: less than 3 months - tripped JOSE ANTONIO 9/13/23    Optivol below threshold since March 2023.    FINAL PARAMETERS    Changes made: turned off JOSE ANTONIO alert. Demonstrated tones - he didn't know what he had been hearing.    Conclusions: stable pacing and sensing thresholds, battery at elective replacement voltage, and successful ATP 7/22/23 for VT.     Referred to Dr. Otero per patient's request to have generator replaced at [facility]. May need to consider upgrade to dual chamber ICD to allow for atrial pacing if/when needed.    Return in about 2 months (around 12/3/2023) for Pacemaker Clinic [employer].      LESLEY Riggs, ACNP-BC, CHFN-BC

## 2023-10-04 VITALS
DIASTOLIC BLOOD PRESSURE: 74 MMHG | WEIGHT: 199.2 LBS | HEART RATE: 76 BPM | BODY MASS INDEX: 31.27 KG/M2 | HEIGHT: 67 IN | OXYGEN SATURATION: 98 % | SYSTOLIC BLOOD PRESSURE: 122 MMHG

## 2023-11-28 ENCOUNTER — CLINICAL SUPPORT NO REQUIREMENTS (OUTPATIENT)
Dept: CARDIOLOGY | Facility: CLINIC | Age: 73
End: 2023-11-28
Payer: OTHER GOVERNMENT

## 2023-11-28 DIAGNOSIS — Z45.02 IMPLANTABLE CARDIOVERTER-DEFIBRILLATOR (ICD) GENERATOR END OF LIFE: Primary | ICD-10-CM

## 2023-11-28 PROCEDURE — 93282 PRGRMG EVAL IMPLANTABLE DFB: CPT | Performed by: NURSE PRACTITIONER

## 2023-11-28 NOTE — PROGRESS NOTES
Medtronic AICD Evaluation Report    November 28, 2023    Indication for AICD: Primary Prevention of Sudden Cardiac Death    Implanting MD: Dr. Otero [facility]    : Medtronic  Model: Visia AF MRI [id number] SN: [id number]    Implant Date: gen change 11/1/23, RV lead 2/21/2014 Dr. Rodriguez    Reason For Evaluation: post implant      DIAGNOSTIC DATA    Atrial paced: n/a% Ventricular paced: Less than 0.1%    Battery status: satisfactory  Voltage: 3.06 V  Estimated battery life: 11 years    Unable to measure Optivol today.    FINAL PARAMETERS    Changes made: none    Conclusions: stable pacing and sensing thresholds, adequate battery reserve, and no shocks or ATP delivered.      Return in about 1 year (around 11/28/2024) for Pacemaker Clinic Medtr[employer].      LESLEY Riggs, ACNP-BC, CHFN-BC

## 2023-12-05 ENCOUNTER — OFFICE VISIT (OUTPATIENT)
Dept: CARDIOLOGY | Facility: CLINIC | Age: 73
End: 2023-12-05
Payer: OTHER GOVERNMENT

## 2023-12-05 VITALS
OXYGEN SATURATION: 97 % | WEIGHT: 196 LBS | HEART RATE: 86 BPM | DIASTOLIC BLOOD PRESSURE: 70 MMHG | SYSTOLIC BLOOD PRESSURE: 134 MMHG | BODY MASS INDEX: 30.76 KG/M2 | HEIGHT: 67 IN

## 2023-12-05 DIAGNOSIS — I10 PRIMARY HYPERTENSION: ICD-10-CM

## 2023-12-05 DIAGNOSIS — I26.99 OTHER PULMONARY EMBOLISM WITHOUT ACUTE COR PULMONALE, UNSPECIFIED CHRONICITY: ICD-10-CM

## 2023-12-05 DIAGNOSIS — I82.622 ARM DVT (DEEP VENOUS THROMBOEMBOLISM), ACUTE, LEFT: ICD-10-CM

## 2023-12-05 DIAGNOSIS — F17.201 TOBACCO ABUSE, IN REMISSION: Chronic | ICD-10-CM

## 2023-12-05 DIAGNOSIS — I25.10 CORONARY ARTERY DISEASE INVOLVING NATIVE CORONARY ARTERY OF NATIVE HEART WITHOUT ANGINA PECTORIS: Primary | ICD-10-CM

## 2023-12-05 DIAGNOSIS — I50.42 CHRONIC COMBINED SYSTOLIC AND DIASTOLIC CONGESTIVE HEART FAILURE: ICD-10-CM

## 2023-12-05 DIAGNOSIS — Z95.810 ICD (IMPLANTABLE CARDIOVERTER-DEFIBRILLATOR) IN PLACE: ICD-10-CM

## 2023-12-05 DIAGNOSIS — I50.22 CHRONIC SYSTOLIC CONGESTIVE HEART FAILURE: ICD-10-CM

## 2023-12-05 DIAGNOSIS — E78.2 MIXED HYPERLIPIDEMIA: ICD-10-CM

## 2023-12-05 DIAGNOSIS — F10.10 ALCOHOL ABUSE: ICD-10-CM

## 2023-12-05 PROBLEM — Z45.02 IMPLANTABLE CARDIOVERTER-DEFIBRILLATOR (ICD) GENERATOR END OF LIFE: Chronic | Status: ACTIVE | Noted: 2023-10-03

## 2023-12-05 PROBLEM — E78.5 HLD (HYPERLIPIDEMIA): Chronic | Status: ACTIVE | Noted: 2023-10-03

## 2023-12-05 PROBLEM — I50.9 CHF (CONGESTIVE HEART FAILURE): Chronic | Status: ACTIVE | Noted: 2023-10-03

## 2023-12-05 NOTE — ASSESSMENT & PLAN NOTE
Encouraged him to decrease alcohol intake to no more than 2 beers per day. He remains uninterested in cutting back and is aware of risks.

## 2023-12-05 NOTE — ASSESSMENT & PLAN NOTE
Requested records. Advised him to decrease Eliquis to 5 mg BID and recommended 3-6 months of therapy. Will consider decreasing to 2.5 mg BID to decrease risk of recurrence but may have been provoked by recent ICD gen change. Will ask hematology to weigh in - they are following him for elevated light chains of undetermined significance.

## 2023-12-05 NOTE — ASSESSMENT & PLAN NOTE
Almost to goal with low dose Entresto and carvedilol. Changed lisinopril to Entresto to decrease risk of hospitalization and decrease mortality. Increase Entresto dose but avoid hypotension.

## 2023-12-05 NOTE — ASSESSMENT & PLAN NOTE
Remains stable without angina but fairly sedentary. Will consider stress testing especially if any worsening.

## 2023-12-05 NOTE — ASSESSMENT & PLAN NOTE
Advised him to reduce Eliquis to 5 mg BID and to continue for at least 3-6 months. Then will considering reducing to 2.5 mg BID for recurrence prophylaxis unless not recommended by hematology as may have been provoked by recent ICD gen change. Requested records and will ask hematology for recommendations.

## 2023-12-05 NOTE — Clinical Note
[facility] in past year at [facility] or [facility]. [place]!

## 2023-12-05 NOTE — PROGRESS NOTES
"Encounter Date:12/05/2023  Chief Complaint:   Subjective    Guy Crawford is a 73 y.o. male who presents to [facility] CARDI[facility] today for two month cardiac follow-up. Fell picking up a concrete block after having ICD gen changed last month and then hospitalized due to blood clot in his arm and lungs. Treated at [facility] - declined transfer. Had surgery (thrombectomy?) on L arm. He is a poor historian.      Congestive Heart Failure  His past medical history is significant for CAD.   Coronary Artery Disease  Risk factors include hyperlipidemia. His past medical history is significant for CHF.   Hypertension    Hyperlipidemia       HPI       IMPLANTABLE CARDIOVERTER DEFIBRILLATOR     Additional comments: 1 MONTH FOLLOW UP              Congestive Heart Failure     Additional comments: Chronic systolic. LV EF 45% 2019 echo. SOA with normal activity unchanged - doesn't do much so doesn't bother him. Can't tell any difference with change to Entresto. No orthopnea but has to sleep on his sides all his life.              Coronary Artery Disease     Additional comments: No chest discomfort. Hx CABG by Dr. Rucker.             Hypertension     Additional comments: BP has been \"good\" despite L arm discomfort from DVT. Hasn't been checking at home.              Pulmonary Embolism     Additional comments: From LUE DVT after ICD gen change - may have had LUE thrombectomy 11/15/23? Has been taking Eliquis 10 mg twice a day since discharge. Didn't decrease to 5 mg BID after 7 days. SOA unchanged despite pulmonary embolism.             Hyperlipidemia     Additional comments: [facility]. No myalgias on atorvastatin. Doesn't know if checked recently.          Last edited by Meredith Catalan APRN on 12/5/2023  3:17 PM.        History: Past medical, surgical, family, and social history reviewed.    Outpatient Medications Marked as Taking for the 12/5/23 encounter (Office Visit) with Meredith Catalan " "Saida APRBALJEET   Medication Sig Dispense Refill    ALPRAZolam (XANAX) 0.5 MG tablet Take 1 tablet by mouth 2 (Two) Times a Day As Needed for Anxiety.      apixaban (ELIQUIS) 5 MG tablet tablet Take 1 tablet by mouth 2 (Two) Times a Day for 180 days. Indications: DVT/PE (active thrombosis) 180 tablet 1    aspirin 81 MG chewable tablet Chew 1 tablet Daily.      atorvastatin (LIPITOR) 40 MG tablet Take 0.5 tablets by mouth Every Night.      carvedilol (COREG) 25 MG tablet Take 1 tablet by mouth 2 (Two) Times a Day With Meals.      cholecalciferol (VITAMIN D3) 25 MCG (1000 UT) tablet Take 1 tablet by mouth Daily.      Flaxseed, Linseed, (FLAX SEED OIL PO) Take  by mouth.      furosemide (LASIX) 20 MG tablet Take 1 tablet by mouth 2 (Two) Times a Day. [facility] fluid and [facility]      HYDROcodone-acetaminophen (NORCO)  MG per tablet Take 1 tablet by mouth Every 6 (Six) Hours As Needed for Moderate Pain.      Loratadine 10 MG capsule Take 1 capsule by mouth. CLARITIN      melatonin 5 MG tablet tablet Take 2 tablets by mouth At Night As Needed.      vitamin B-12 (CYANOCOBALAMIN) 1000 MCG tablet Take 1 tablet by mouth Daily.      [DISCONTINUED] apixaban (ELIQUIS) 5 MG tablet tablet Take 2 tablets by mouth 2 (Two) Times a Day.      [DISCONTINUED] sacubitril-valsartan (ENTRESTO) 24-26 MG tablet Take 1 tablet by mouth 2 (Two) Times a Day. 180 tablet 3        Objective     Vital Signs:   /70 (BP Location: Left arm, Patient Position: Sitting, Cuff Size: Adult)   Pulse 86   Ht 170.2 cm (67\")   Wt 88.9 kg (196 lb)   SpO2 97%   BMI 30.70 kg/m²   Wt Readings from Last 3 Encounters:   12/05/23 88.9 kg (196 lb)   10/03/23 90.4 kg (199 lb 3.2 oz)   12/17/19 84.4 kg (186 lb)         Vitals reviewed.   Constitutional:       Appearance: Well-developed and not in distress. Chronically ill-appearing.   Eyes:      General: No scleral icterus.  Neck:      Vascular: No JVD.   Pulmonary:      Effort: Pulmonary effort is " normal.      Breath sounds: Normal breath sounds.   Cardiovascular:      Normal rate. Regular rhythm.      No gallop.       Comments: 1+ edema LUE  Pulses:     Intact distal pulses.   Edema:     Pretibial: bilateral trace pitting edema of the pretibial area.     Ankle: bilateral trace pitting edema of the ankle.  Skin:     General: Skin is warm and dry.      Comments: Dressing intact to LUE   Neurological:      Mental Status: Alert, oriented to person, place, and time and oriented to person, place and time.   Psychiatric:         Mood and Affect: Affect is blunt (upset about pharmacy issues).      Comments: Limited insight         Result Review  Data Reviewed:  The following data was reviewed by: LESLEY Stanford on 12/05/2023  CARDIOLOGY VISIT - SCAN - PACEMAKER INTERROGATION/[employer]/11- (11/28/2023)   Scan on 11/15/2023 1430 by Meredith Catalan APRN: CBC/CMP/ [facility]/ 10-    Scan on 11/15/2023 1430 by Meredith Catalan APRN: CXR/ [facility]/ 10-    Scan on 11/15/2023 1429 by Meredith Catalan APRN: EKG/ [facility] 10-      Lab Results - Last 18 Months   Lab Units 08/02/23  1210 06/05/23  1512 04/25/23  1208   HEMOGLOBIN A1C %  --   --  5.8   WBC K/uL 7.58 7.13  --    RBC M/uL 4.58* 4.44*  --    HEMATOCRIT % 41.6 44.2  --    MCV fL 90.8 99.5*  --    MCH pg 31.0 31.1  --                   Assessment and Plan   Problem List Items Addressed This Visit          Cardiac and Vasculature    CAD (coronary artery disease) - Primary (Chronic)    Overview     S/p CABG 2003         Current Assessment & Plan     Remains stable without angina but fairly sedentary. Will consider stress testing especially if any worsening.         Relevant Medications    sacubitril-valsartan (ENTRESTO) 49-51 MG tablet    CHF (congestive heart failure) (Chronic)    Overview     EF 45% 2019 echo         Current Assessment & Plan     Chronic systolic/diastolic. Class II, Stage C. Changed lisinopril to Entresto  24/26 mg twice a day after 36 hour washout period last visit - again reviewed indication and benefits. Increase Entresto to 49/51 mg BID. Provided samples 2 bottles of #28 tablets each and written instructions. Will consider addition of Jardiance or Farxiga. Will consider addition of aldosterone antagonist if BP allows. Continue carvedilol. Reviewed s/s of HF exacerbation and when to call.         Relevant Medications    sacubitril-valsartan (ENTRESTO) 49-51 MG tablet    HLD (hyperlipidemia) (Chronic)    Current Assessment & Plan     Fair control per 10/22 labs on atorvastatin. Continue present therapy per now. May consider increasing or adding Zetia if not to goal with upcoming yearly labs - not checked with 10/2023 labs. May need to order if not done in past year.          HTN (hypertension) (Chronic)    Current Assessment & Plan     Almost to goal with low dose Entresto and carvedilol. Changed lisinopril to Entresto to decrease risk of hospitalization and decrease mortality. Increase Entresto dose but avoid hypotension.         Relevant Medications    sacubitril-valsartan (ENTRESTO) 49-51 MG tablet    ICD (implantable cardioverter-defibrillator) in place    Overview     Medtronic gen change 11/1/23         Current Assessment & Plan     Normal function per 11/28/23 in office interrogation            Coag and Thromboembolic    Arm DVT (deep venous thromboembolism), acute, left    Current Assessment & Plan     Advised him to reduce Eliquis to 5 mg BID and to continue for at least 3-6 months. Then will considering reducing to 2.5 mg BID for recurrence prophylaxis unless not recommended by hematology as may have been provoked by recent ICD gen change. Requested records and will ask hematology for recommendations.         Relevant Medications    apixaban (ELIQUIS) 5 MG tablet tablet    Pulmonary embolism    Current Assessment & Plan     Requested records. Advised him to decrease Eliquis to 5 mg BID and recommended 3-6  months of therapy. Will consider decreasing to 2.5 mg BID to decrease risk of recurrence but may have been provoked by recent ICD gen change. Will ask hematology to weigh in - they are following him for elevated light chains of undetermined significance.         Relevant Medications    apixaban (ELIQUIS) 5 MG tablet tablet       Mental Health    Alcohol abuse (Chronic)    Current Assessment & Plan     Encouraged him to decrease alcohol intake to no more than 2 beers per day. He remains uninterested in cutting back and is aware of risks.            Tobacco    Tobacco abuse, in remission (Chronic)    Current Assessment & Plan     Encouraged continued cessation.            Patient was given instructions and counseling regarding his condition or for health maintenance advice. Please see specific information pulled into the AVS if appropriate.    Follow Up :   Return in about 3 months (around 3/5/2024) for Recheck, Heart Failure Clinic.             LESLEY Riggs, ACNP-BC, CHFN-BC

## 2023-12-05 NOTE — ASSESSMENT & PLAN NOTE
Fair control per 10/22 labs on atorvastatin. Continue present therapy per now. May consider increasing or adding Zetia if not to goal with upcoming yearly labs - not checked with 10/2023 labs. May need to order if not done in past year.

## 2023-12-05 NOTE — ASSESSMENT & PLAN NOTE
Chronic systolic/diastolic. Class II, Stage C. Changed lisinopril to Entresto 24/26 mg twice a day after 36 hour washout period last visit - again reviewed indication and benefits. Increase Entresto to 49/51 mg BID. Provided samples 2 bottles of #28 tablets each and written instructions. Will consider addition of Jardiance or Farxiga. Will consider addition of aldosterone antagonist if BP allows. Continue carvedilol. Reviewed s/s of HF exacerbation and when to call.

## 2023-12-19 DIAGNOSIS — E78.2 MIXED HYPERLIPIDEMIA: Chronic | ICD-10-CM

## 2023-12-19 DIAGNOSIS — I25.10 CORONARY ARTERY DISEASE INVOLVING NATIVE CORONARY ARTERY OF NATIVE HEART WITHOUT ANGINA PECTORIS: Primary | Chronic | ICD-10-CM

## 2024-01-31 ENCOUNTER — TELEPHONE (OUTPATIENT)
Dept: HEMATOLOGY | Age: 74
End: 2024-01-31

## 2024-01-31 NOTE — TELEPHONE ENCOUNTER
Called patient and reminded patient of their appointment on 2/2/2024 and patient confirmed they would be here

## 2024-02-01 DIAGNOSIS — I25.10 CORONARY ARTERY DISEASE INVOLVING NATIVE CORONARY ARTERY OF NATIVE HEART WITHOUT ANGINA PECTORIS: Chronic | ICD-10-CM

## 2024-02-01 DIAGNOSIS — E78.2 MIXED HYPERLIPIDEMIA: Primary | Chronic | ICD-10-CM

## 2024-02-01 RX ORDER — ATORVASTATIN CALCIUM 40 MG/1
40 TABLET, FILM COATED ORAL NIGHTLY
Qty: 90 TABLET | Refills: 3 | Status: SHIPPED | OUTPATIENT
Start: 2024-02-01 | End: 2025-01-31

## 2024-03-13 ENCOUNTER — TELEPHONE (OUTPATIENT)
Dept: HEMATOLOGY | Age: 74
End: 2024-03-13

## 2024-03-13 NOTE — TELEPHONE ENCOUNTER
Called Patient and reminded patient of their appointment on 03/15/2024 and patient confirmed they would be here. Called pt on mobile with number [phone] as listed in chart.

## 2024-03-14 ENCOUNTER — OFFICE VISIT (OUTPATIENT)
Dept: CARDIOLOGY | Facility: CLINIC | Age: 74
End: 2024-03-14
Payer: OTHER GOVERNMENT

## 2024-03-14 VITALS
BODY MASS INDEX: 31.6 KG/M2 | HEART RATE: 78 BPM | SYSTOLIC BLOOD PRESSURE: 124 MMHG | HEIGHT: 67 IN | WEIGHT: 201.3 LBS | OXYGEN SATURATION: 99 % | DIASTOLIC BLOOD PRESSURE: 78 MMHG

## 2024-03-14 DIAGNOSIS — F17.201 TOBACCO ABUSE, IN REMISSION: Chronic | ICD-10-CM

## 2024-03-14 DIAGNOSIS — I25.10 CORONARY ARTERY DISEASE INVOLVING NATIVE CORONARY ARTERY OF NATIVE HEART WITHOUT ANGINA PECTORIS: Chronic | ICD-10-CM

## 2024-03-14 DIAGNOSIS — I50.42 CHRONIC COMBINED SYSTOLIC AND DIASTOLIC CONGESTIVE HEART FAILURE: Primary | Chronic | ICD-10-CM

## 2024-03-14 DIAGNOSIS — F10.10 ALCOHOL ABUSE: Chronic | ICD-10-CM

## 2024-03-14 DIAGNOSIS — I10 PRIMARY HYPERTENSION: Chronic | ICD-10-CM

## 2024-03-14 DIAGNOSIS — E78.2 MIXED HYPERLIPIDEMIA: Chronic | ICD-10-CM

## 2024-03-14 DIAGNOSIS — Z95.810 ICD (IMPLANTABLE CARDIOVERTER-DEFIBRILLATOR) IN PLACE: ICD-10-CM

## 2024-03-14 DIAGNOSIS — R93.1 LEFT VENTRICULAR EJECTION FRACTION LESS THAN 40%: ICD-10-CM

## 2024-03-14 NOTE — ASSESSMENT & PLAN NOTE
Wasn't quite to goal per 1/2024 labs. Increased atorvastatin. Recheck fasting CMP and lipid panel (reprinted lab orders for him to take to lab of choice).

## 2024-03-14 NOTE — ASSESSMENT & PLAN NOTE
Remains stable without angina but fairly sedentary. Encouraged healthy diet, gradual increase in activity. Added Jardiance. Continue Eliquis (hx of DVT/PE) but can stop aspirin if needed as long as stays on Eliquis. Defer to vascular if/when to decrease Eliquis dose.

## 2024-03-14 NOTE — ASSESSMENT & PLAN NOTE
Chronic systolic/diastolic. Remains Class II, Stage C. Continue carvedilol and Entresto. Add Jardiance 10 mg daily - reviewed benefits vs side effects, when to call. Provided #14 samples [facility]. Will consider addition of aldosterone antagonist if BP allows at next visit. Advised him to wean off furosemide and use 20 mg daily PRN only. Encouraged him to try to cut back on alcohol intake.   n/a

## 2024-03-14 NOTE — ASSESSMENT & PLAN NOTE
Very well controlled with change to Entresto and other HF medications. Continue present therapy. Wean off furosemide. Add Jardiance. Call if any dizziness/weakness/hypotension.

## 2024-03-14 NOTE — ASSESSMENT & PLAN NOTE
Normal function per 11/28/23 in office interrogation. Will see if successful remote interrogation last month. No reported shocks.

## 2024-03-14 NOTE — PROGRESS NOTES
"Encounter Date:03/14/2024  Chief Complaint:   Subjective    Guy Crawford is a 73 y.o. male who presents to [facility] [place] today for three month cardiac follow-up. He has been doing fairly well.      Congestive Heart Failure  His past medical history is significant for CAD.   Coronary Artery Disease  Risk factors include hyperlipidemia. His past medical history is significant for CHF.   Hypertension    Hyperlipidemia       HPI       Congestive Heart Failure     Additional comments: Chronic systolic. LV EF 45% 2019 echo down to 35-40% 11/2023 echo. SOA with normal activity unchanged - doesn't do much so doesn't bother him. Entresto may be helping a little - felt better after mowing the yard than he has all winter. No orthopnea but has to sleep on his sides all his life - now on Left side only. No PND, swelling, or sudden weight gain.             Coronary Artery Disease     Additional comments: No chest discomfort. Nothing similar to before CABG but doesn't really remember.              Hypertension     Additional comments: Hasn't been checking. Usually \"great\" for the last 3-5 years. No headaches, dizziness, swelling, or fatigue.             Hyperlipidemia     Additional comments: No myalgias or yellowing of eyes or skin since atorvastatin increased late January - hasn't gotten labs checked yet.              Follow-up     Additional comments: INCREASED STATIN AFTER LABS/ 3 MO FU          Last edited by Meredith Catalan APRN on 3/14/2024  3:55 PM.        History: Past medical, surgical, family, and social history reviewed.    Outpatient Medications Marked as Taking for the 3/14/24 encounter (Office Visit) with Meredith Catalan APRN   Medication Sig Dispense Refill    allopurinol (ZYLOPRIM) 300 MG tablet Take 1 tablet by mouth Daily.      ALPRAZolam (XANAX) 0.5 MG tablet Take 1 tablet by mouth 2 (Two) Times a Day As Needed for Anxiety. Takes every night      apixaban (ELIQUIS) " "5 MG tablet tablet Take 1 tablet by mouth 2 (Two) Times a Day for 180 days. Indications: DVT/PE (active thrombosis) 180 tablet 1    aspirin 81 MG chewable tablet Chew 1 tablet Daily.      atorvastatin (LIPITOR) 40 MG tablet Take 1 tablet by mouth Every Night. 90 tablet 3    carvedilol (COREG) 25 MG tablet Take 1 tablet by mouth 2 (Two) Times a Day With Meals.      cholecalciferol (VITAMIN D3) 25 MCG (1000 UT) tablet Take 1 tablet by mouth Daily.      diphenhydrAMINE-acetaminophen (TYLENOL PM)  MG tablet per tablet Take 1 tablet by mouth At Night As Needed for Sleep.      Flaxseed, Linseed, (FLAX SEED OIL PO) Take  by mouth.      furosemide (LASIX) 20 MG tablet Take 1 tablet by mouth 2 (Two) Times a Day. [facility]      HYDROcodone-acetaminophen (NORCO)  MG per tablet Take 1 tablet by mouth Every 6 (Six) Hours As Needed for Moderate Pain.      Loratadine 10 MG capsule Take 1 capsule by mouth. CLARITIN      melatonin 5 MG tablet tablet Take 2 tablets by mouth At Night As Needed.      sacubitril-valsartan (ENTRESTO) 49-51 MG tablet Take 1 tablet by mouth 2 (Two) Times a Day. 180 tablet 3    vitamin B-12 (CYANOCOBALAMIN) 1000 MCG tablet Take 1 tablet by mouth Daily.          Objective     Vital Signs:   /78 (BP Location: Left arm, Patient Position: Sitting, Cuff Size: Adult)   Pulse 78   Ht 170.2 cm (67.01\")   Wt 91.3 kg (201 lb 4.8 oz)   SpO2 99%   BMI 31.52 kg/m²   Wt Readings from Last 3 Encounters:   03/14/24 91.3 kg (201 lb 4.8 oz)   12/05/23 88.9 kg (196 lb)   10/03/23 90.4 kg (199 lb 3.2 oz)         Vitals reviewed.   Constitutional:       Appearance: Well-developed, well-groomed and not in distress. Obese. Chronically ill-appearing.   Eyes:      General: No scleral icterus.  Neck:      Vascular: No JVD.   Pulmonary:      Effort: Pulmonary effort is normal.      Breath sounds: Normal breath sounds.   Cardiovascular:      Normal rate. Regular rhythm.      No gallop.  "   Pulses:     Intact distal pulses.   Edema:     Pretibial: bilateral trace pitting edema of the pretibial area.     Ankle: bilateral trace pitting edema of the ankle.  Skin:     General: Skin is warm and dry.   Neurological:      Mental Status: Alert, oriented to person, place, and time and oriented to person, place and time.      Gait: Gait abnormal.   Psychiatric:         Attention and Perception: Attention normal.         Mood and Affect: Affect is not blunt.         Speech: Speech normal.         Behavior: Behavior normal. Behavior is cooperative.         Cognition and Memory: Cognition normal.         Result Review  Data Reviewed:  The following data was reviewed by: LESLEY Stanford on 03/14/2024  Lipid Panel (01/31/2024 00:00)  VASCULAR STUDIES SCANNED (01/09/2024)   Echocardiogram Scan (11/14/2023)   Scan on 11/15/2023 1430 by Meredith Catalan APRN: CBC/CMP/ [facility]/ 10-   EXTERNAL MEDICAL RECORDS - [facility] CARDIOLOGY CONSULT 11/16/23 (11/16/2023)   OPERATIVE/PROCEDURE REPORT - [facility] OPERATIVE NOTE 11/16/23 (11/16/2023)   Lab Results - Last 18 Months   Lab Units 08/02/23  1210 06/05/23  1512 04/25/23  1208   HEMOGLOBIN A1C %  --   --  5.8   WBC K/uL 7.58 7.13  --    RBC M/uL 4.58* 4.44*  --    HEMATOCRIT % 41.6 44.2  --    MCV fL 90.8 99.5*  --    MCH pg 31.0 31.1  --                   Assessment and Plan   Problem List Items Addressed This Visit          Cardiac and Vasculature    CAD (coronary artery disease) (Chronic)    Overview     S/p CABG 2003         Current Assessment & Plan     Remains stable without angina but fairly sedentary. Encouraged healthy diet, gradual increase in activity. Added Jardiance. Continue Eliquis (hx of DVT/PE) but can stop aspirin if needed as long as stays on Eliquis. Defer to vascular if/when to decrease Eliquis dose.          Relevant Medications    empagliflozin (Jardiance) 10 MG tablet tablet    CHF (congestive heart failure) - Primary  (Chronic)    Overview     EF 45% 2019 echo  EF 35-40%, mild LAE, mild MR, grade I DD, non-dilated, RVSP 31 mm Hg 11/2023         Current Assessment & Plan     Chronic systolic/diastolic. Remains Class II, Stage C. Continue carvedilol and Entresto. Add Jardiance 10 mg daily - reviewed benefits vs side effects, when to call. Provided [facility] Will consider addition of aldosterone antagonist if BP allows at next visit. Advised him to wean off furosemide and use 20 mg daily PRN only. Encouraged him to try to cut back on alcohol intake.         Relevant Medications    empagliflozin (Jardiance) 10 MG tablet tablet    HLD (hyperlipidemia) (Chronic)    Current Assessment & Plan     Wasn't quite to goal per 1/2024 labs. Increased atorvastatin. Recheck fasting CMP and lipid panel (reprinted lab orders for him to take to lab of choice).          HTN (hypertension) (Chronic)    Current Assessment & Plan     Very well controlled with change to Entresto and other HF medications. Continue present therapy. Wean off furosemide. Add Jardiance. Call if any dizziness/weakness/hypotension.         ICD (implantable cardioverter-defibrillator) in place (Chronic)    Overview     Medtronic gen change 11/1/23         Current Assessment & Plan     Normal function per 11/28/23 in office interrogation. Will see if successful remote interrogation last month. No reported shocks.         Left ventricular ejection fraction less than 40%       Mental Health    Alcohol abuse (Chronic)    Current Assessment & Plan     Encouraged him to decrease alcohol intake to no more than 2 beers per day. He remains uninterested in cutting back and is aware of risks.            Tobacco    Tobacco abuse, in remission (Chronic)    Current Assessment & Plan     Encouraged continued cessation.          Patient was given instructions and counseling regarding his condition or for health maintenance advice. Please see specific information pulled into  the AVS if appropriate.    Follow Up :   Return in about 6 months (around 9/14/2024) for Recheck.                  Meredith Catalan, APRN, ACNP-BC, CHFN-BC

## 2024-03-21 DIAGNOSIS — E78.2 MIXED HYPERLIPIDEMIA: Chronic | ICD-10-CM

## 2024-03-21 DIAGNOSIS — I25.10 CORONARY ARTERY DISEASE INVOLVING NATIVE CORONARY ARTERY OF NATIVE HEART WITHOUT ANGINA PECTORIS: Chronic | ICD-10-CM

## 2024-03-21 NOTE — PROGRESS NOTES
Please notify patient his cholesterol looks great and to continue present therapy. His kidney function and electrolytes look good. His protein (albumin) and calcium are just a little low. Encourage him to increase healthy proteins and sources of calcium in his diet. One of his liver enzymes is just slightly elevated but not enough to change or stop medications at this time - will continue to monitor. Encourage him to try to cut back on alcohol intake. Recheck at least yearly. TX!

## 2024-05-16 ENCOUNTER — TELEPHONE (OUTPATIENT)
Dept: HEMATOLOGY | Age: 74
End: 2024-05-16

## 2024-05-16 NOTE — TELEPHONE ENCOUNTER
I called and reminded of their appt on 05/21/24. Patient is aware to come at time of appt and not lab appt time. Patient confirmed appt date and time.

## 2024-05-21 DIAGNOSIS — R76.8 ELEVATED SERUM IMMUNOGLOBULIN FREE LIGHT CHAINS: Primary | ICD-10-CM

## 2024-05-21 DIAGNOSIS — C64.2 CLEAR CELL CARCINOMA OF LEFT KIDNEY (HCC): ICD-10-CM

## 2024-05-21 NOTE — PROGRESS NOTES
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 Tumor measures 4.6 cm in greatest dimension.  Margins of resection are negative (renal vein, artery, ureteral and perinephric soft tissue).   No known evidence to suggest recurrent disease.      Currently monitored through [facility]    3.  Peripheral neuropathy, present for >3 years.  Left being worse than the right. Dr. Chase recommended to cease alcohol use and could then move forward with pharmaceutical treatment, Mr. Farnsworth has declined to cease his alcohol use.  Persistent with no complaints    -Follow-up with Dr. Chase as recommended    4. Alcohol use, reports currently drinking 6 beers nightly for the past 50+ years  We talked about the importance of quitting the use of alcohol for approximately 4-5 minutes. Specifically, we discussed the risk related alcohol including but not limited to oral and esophageal carcinomas, liver disease, poor nutritional benefit, risk for injury due to potential toxicity and financial loss. I advised to quit use of alcohol and encouraged attending AA meetings.. The patient is contemplated at this time. I will follow-up on alcohol cessation during the next visit and continue to encourage quitting alcohol use.    I discussed all of the above findings included in the assessment and plan with the patient and the patient is in agreement to move forward with current recommendations/treatment.  I have addressed all of their questions and concerns that were verbalized.    FOLLOW UP:  Follow-up appointment made for 12 months, or sooner if needed for elevated kappa light chain of unknown significance and history of renal carcinoma  Continue to follow with medical providers as recommended  Labs at next visit: CBC, CMP and myeloma studies    EMR Dragon/Transcription disclaimer:   Much of this encounter note is an electronic transcription/translation of spoken language to printed text. The electronic translation of spoken language may permit erroneous, or at times,

## 2024-07-24 ENCOUNTER — TELEPHONE (OUTPATIENT)
Dept: HEMATOLOGY | Age: 74
End: 2024-07-24

## 2024-07-24 NOTE — TELEPHONE ENCOUNTER
Called Patient and reminded patient of their appointment on 08/02/2024 and patient confirmed they would be here. Reminded patient to just come at appointment time, and to not come at the lab appointment time. Reminded patient that we will not check them in any more than 30 minutes before appointment time.  We have now moved to the [facility] [place] that is located between our old office and the ER at the [facility]

## 2024-08-01 DIAGNOSIS — C64.2 CLEAR CELL CARCINOMA OF LEFT KIDNEY (HCC): Primary | ICD-10-CM

## 2024-08-02 ENCOUNTER — HOSPITAL ENCOUNTER (OUTPATIENT)
Dept: INFUSION THERAPY | Age: 74
Discharge: HOME | End: 2024-08-02
Payer: OTHER GOVERNMENT

## 2024-08-02 ENCOUNTER — OFFICE VISIT (OUTPATIENT)
Dept: HEMATOLOGY | Age: 74
End: 2024-08-02
Payer: OTHER GOVERNMENT

## 2024-08-02 VITALS
BODY MASS INDEX: 30.61 KG/M2 | HEIGHT: 67 IN | DIASTOLIC BLOOD PRESSURE: 64 MMHG | TEMPERATURE: 98.7 F | HEART RATE: 88 BPM | SYSTOLIC BLOOD PRESSURE: 124 MMHG | WEIGHT: 195 LBS | OXYGEN SATURATION: 95 %

## 2024-08-02 DIAGNOSIS — R76.8 ELEVATED SERUM IMMUNOGLOBULIN FREE LIGHT CHAINS: Primary | ICD-10-CM

## 2024-08-02 DIAGNOSIS — Z78.9 ALCOHOL USE: ICD-10-CM

## 2024-08-02 DIAGNOSIS — C64.2 CLEAR CELL CARCINOMA OF LEFT KIDNEY (HCC): ICD-10-CM

## 2024-08-02 DIAGNOSIS — R76.8 ELEVATED SERUM IMMUNOGLOBULIN FREE LIGHT CHAINS: ICD-10-CM

## 2024-08-02 LAB
ALBUMIN SERPL-MCNC: 3.9 G/DL (ref 3.5–5.2)
ALP SERPL-CCNC: 118 U/L (ref 40–129)
ALT SERPL-CCNC: 20 U/L (ref 5–41)
ANION GAP SERPL CALCULATED.3IONS-SCNC: 13 MMOL/L (ref 7–19)
AST SERPL-CCNC: 20 U/L (ref 5–40)
BASOPHILS # BLD: 0.08 K/UL (ref 0.01–0.08)
BASOPHILS NFR BLD: 0.9 % (ref 0.1–1.2)
BILIRUB SERPL-MCNC: 0.6 MG/DL (ref 0–1.2)
BUN SERPL-MCNC: 11 MG/DL (ref 8–23)
CALCIUM SERPL-MCNC: 8.5 MG/DL (ref 8.8–10.2)
CHLORIDE SERPL-SCNC: 100 MMOL/L (ref 98–107)
CO2 SERPL-SCNC: 21 MMOL/L (ref 22–29)
CREAT SERPL-MCNC: 1 MG/DL (ref 0.7–1.2)
EOSINOPHIL # BLD: 0.2 K/UL (ref 0.04–0.54)
EOSINOPHIL NFR BLD: 2.2 % (ref 0.7–7)
ERYTHROCYTE [DISTWIDTH] IN BLOOD BY AUTOMATED COUNT: 14.4 % (ref 11.6–14.4)
GLUCOSE SERPL-MCNC: 105 MG/DL (ref 70–99)
HCT VFR BLD AUTO: 41.9 % (ref 40.1–51)
HGB BLD-MCNC: 13.7 G/DL (ref 13.7–17.5)
LYMPHOCYTES # BLD: 2.07 K/UL (ref 1.18–3.74)
LYMPHOCYTES NFR BLD: 22.3 % (ref 19.3–53.1)
MCH RBC QN AUTO: 28.4 PG (ref 25.7–32.2)
MCHC RBC AUTO-ENTMCNC: 32.7 G/DL (ref 32.3–36.5)
MCV RBC AUTO: 86.7 FL (ref 79–92.2)
MONOCYTES # BLD: 0.75 K/UL (ref 0.24–0.82)
MONOCYTES NFR BLD: 8.1 % (ref 4.7–12.5)
NEUTROPHILS # BLD: 6.15 K/UL (ref 1.56–6.13)
NEUTS SEG NFR BLD: 66.2 % (ref 34–71.1)
PLATELET # BLD AUTO: 208 K/UL (ref 163–337)
PMV BLD AUTO: 11.3 FL (ref 7.4–10.4)
POTASSIUM SERPL-SCNC: 4.7 MMOL/L (ref 3.5–5.1)
PROT SERPL-MCNC: 6.9 G/DL (ref 6.4–8.3)
RBC # BLD AUTO: 4.83 M/UL (ref 4.63–6.08)
SODIUM SERPL-SCNC: 134 MMOL/L (ref 136–145)
WBC # BLD AUTO: 9.28 K/UL (ref 4.23–9.07)

## 2024-08-02 PROCEDURE — 36415 COLL VENOUS BLD VENIPUNCTURE: CPT

## 2024-08-02 PROCEDURE — 80053 COMPREHEN METABOLIC PANEL: CPT

## 2024-08-02 PROCEDURE — 99212 OFFICE O/P EST SF 10 MIN: CPT

## 2024-08-02 PROCEDURE — 99213 OFFICE O/P EST LOW 20 MIN: CPT | Performed by: NURSE PRACTITIONER

## 2024-08-02 PROCEDURE — 85025 COMPLETE CBC W/AUTO DIFF WBC: CPT

## 2024-08-02 PROCEDURE — 1123F ACP DISCUSS/DSCN MKR DOCD: CPT | Performed by: NURSE PRACTITIONER

## 2024-08-05 LAB — B2 MICROGLOB SERPL-MCNC: 3.5 MG/L

## 2024-08-06 ASSESSMENT — ENCOUNTER SYMPTOMS
DIARRHEA: 0
BLOOD IN STOOL: 0
VOMITING: 0
BACK PAIN: 0
NAUSEA: 0
EYE REDNESS: 0
WHEEZING: 0
CONSTIPATION: 0
SORE THROAT: 0
EYES NEGATIVE: 1
EYE PAIN: 0
EYE DISCHARGE: 0
GASTROINTESTINAL NEGATIVE: 1
ABDOMINAL PAIN: 0
COUGH: 0

## 2024-09-12 ENCOUNTER — OFFICE VISIT (OUTPATIENT)
Dept: CARDIOLOGY | Facility: CLINIC | Age: 74
End: 2024-09-12
Payer: OTHER GOVERNMENT

## 2024-09-12 VITALS
BODY MASS INDEX: 31.71 KG/M2 | HEART RATE: 86 BPM | OXYGEN SATURATION: 98 % | HEIGHT: 67 IN | DIASTOLIC BLOOD PRESSURE: 72 MMHG | WEIGHT: 202 LBS | SYSTOLIC BLOOD PRESSURE: 126 MMHG

## 2024-09-12 DIAGNOSIS — I50.42 CHRONIC COMBINED SYSTOLIC AND DIASTOLIC CONGESTIVE HEART FAILURE: Primary | Chronic | ICD-10-CM

## 2024-09-12 DIAGNOSIS — E78.2 MIXED HYPERLIPIDEMIA: Chronic | ICD-10-CM

## 2024-09-12 DIAGNOSIS — Z86.711 HISTORY OF PULMONARY EMBOLISM: ICD-10-CM

## 2024-09-12 DIAGNOSIS — F17.201 TOBACCO ABUSE, IN REMISSION: Chronic | ICD-10-CM

## 2024-09-12 DIAGNOSIS — I10 PRIMARY HYPERTENSION: Chronic | ICD-10-CM

## 2024-09-12 DIAGNOSIS — Z95.810 ICD (IMPLANTABLE CARDIOVERTER-DEFIBRILLATOR) IN PLACE: Chronic | ICD-10-CM

## 2024-09-12 DIAGNOSIS — F10.10 ALCOHOL ABUSE: Chronic | ICD-10-CM

## 2024-09-12 DIAGNOSIS — I25.10 CORONARY ARTERY DISEASE INVOLVING NATIVE CORONARY ARTERY OF NATIVE HEART WITHOUT ANGINA PECTORIS: Chronic | ICD-10-CM

## 2024-09-12 NOTE — ASSESSMENT & PLAN NOTE
Chronic systolic/diastolic. Remains Class II, Stage C. Continue carvedilol, Entresto, and Jardiance 10 mg daily. Will consider addition of aldosterone antagonist if BP allows at next visit. Previously advised him to wean off furosemide and use 20 mg daily PRN only but continues to take twice a day? Again encouraged him to try to cut back on alcohol intake which he is unwilling to do at this time.

## 2024-09-12 NOTE — ASSESSMENT & PLAN NOTE
Remains stable without angina but remains fairly sedentary. Encouraged healthy diet, gradual increase in activity. Added Jardiance. Continue aspirin - unknown when Eliquis stopped - had Left subclavian, axillary, and brachial thrombus and bilateral PEs with large saddle embolus R main pulmonary 11/2023 and Left ulnar thrombus 1/2024 - provoked by ICD gen change? Deferred duration of Eliquis to vascular (Dr Callie Otero) but might be beneficial to stay on low dose lifelong to prevent recurrence.

## 2024-09-12 NOTE — ASSESSMENT & PLAN NOTE
Normal function per 5/2024 remote interrogation. No reported shocks or alerts. Continue every 3 month remote and yearly in office.

## 2024-09-12 NOTE — ASSESSMENT & PLAN NOTE
Again encouraged him to decrease alcohol intake to no more than 2 beers per day. He remains uninterested in cutting back and is aware of risks.

## 2024-09-12 NOTE — Clinical Note
Check with Dr. Callie Otero to see if she ok'd him to stop Eliquis - request copy of most recent office note. Did he say why he was no longer taking or did it come up? TX!

## 2024-09-12 NOTE — PROGRESS NOTES
"Encounter Date:09/12/2024  Chief Complaint:   Subjective    Guy Crawford is a 74 y.o. male who presents to [facility] [place] today for six month cardiac follow-up.      Congestive Heart Failure  His past medical history is significant for CAD.   Coronary Artery Disease  Risk factors include hyperlipidemia. His past medical history is significant for CHF.   Hypertension    Hyperlipidemia    Follow-up  Conditions present:  Hyperlipidemia       HPI       Congestive Heart Failure     Additional comments: Chronic systolic. LV EF 45% 2019 echo down to 35-40% 11/2023 echo. SOA with normal activity unchanged - doesn't do much so doesn't bother him. Entresto might have helped a little - feels good. No orthopnea but has to sleep on his sides all his life. No PND, swelling, or sudden weight gain.             Coronary Artery Disease     Additional comments: No chest discomfort. Nothing similar to before CABG but doesn't really remember.             Hypertension     Additional comments: Hasn't been checking at home. Has been \"good\" at other offices.              Hyperlipidemia     Additional comments: Well controlled per 3/2024 labs after statin increased 1/2024. Still drinking 6-8 beers per night and eating a lot of fast food. Liver enzymes improved to normal 1/2024 and 3/2024.             Follow-up     Additional comments: Six month          Last edited by Meredith Catalan APRN on 9/12/2024  1:41 PM.        History: Past medical, surgical, family, and social history reviewed.    Outpatient Medications Marked as Taking for the 9/12/24 encounter (Office Visit) with Meredith Catalan APRN   Medication Sig Dispense Refill    allopurinol (ZYLOPRIM) 300 MG tablet Take 1 tablet by mouth Daily.      ALPRAZolam (XANAX) 0.5 MG tablet Take 1 tablet by mouth 2 (Two) Times a Day As Needed for Anxiety. Takes every night      amitriptyline (ELAVIL) 25 MG tablet Take 1 tablet by mouth Every Night. " "*unsure if taking- he thinks he is* 9-12-24      aspirin 81 MG chewable tablet Chew 1 tablet Daily.      atorvastatin (LIPITOR) 40 MG tablet Take 1 tablet by mouth Every Night. 90 tablet 3    carvedilol (COREG) 25 MG tablet Take 1 tablet by mouth 2 (Two) Times a Day With Meals.      cholecalciferol (VITAMIN D3) 25 MCG (1000 UT) tablet Take 1 tablet by mouth Daily.      diphenhydrAMINE-acetaminophen (TYLENOL PM)  MG tablet per tablet Take 1 tablet by mouth At Night As Needed for Sleep.      empagliflozin (Jardiance) 10 MG tablet tablet Take 1 tablet by mouth Daily. 90 tablet 3    Flaxseed, Linseed, (FLAX SEED OIL PO) Take  by mouth.      furosemide (LASIX) 20 MG tablet Take 1 tablet by mouth 2 (Two) Times a Day. [facility]      HYDROcodone-acetaminophen (NORCO)  MG per tablet Take 1 tablet by mouth Every 6 (Six) Hours As Needed for Moderate Pain.      Loratadine 10 MG capsule Take 1 capsule by mouth. CLARITIN      melatonin 5 MG tablet tablet Take 2 tablets by mouth At Night As Needed.      sacubitril-valsartan (ENTRESTO) 49-51 MG tablet Take 1 tablet by mouth 2 (Two) Times a Day. 180 tablet 3    vitamin B-12 (CYANOCOBALAMIN) 1000 MCG tablet Take 1 tablet by mouth Daily.          Objective     Vital Signs:   /72 (BP Location: Right arm, Patient Position: Sitting, Cuff Size: Adult)   Pulse 86   Ht 170.2 cm (67.01\")   Wt 91.6 kg (202 lb)   SpO2 98%   BMI 31.63 kg/m²   Wt Readings from Last 3 Encounters:   09/12/24 91.6 kg (202 lb)   03/14/24 91.3 kg (201 lb 4.8 oz)   12/05/23 88.9 kg (196 lb)         Vitals reviewed.   Constitutional:       Appearance: Well-developed, well-groomed and not in distress. Obese. Chronically ill-appearing.   Eyes:      General: No scleral icterus.  Neck:      Vascular: No JVD.   Pulmonary:      Effort: Pulmonary effort is normal.      Breath sounds: Normal breath sounds.   Cardiovascular:      Normal rate. Regular rhythm.      No gallop.  "   Pulses:     Intact distal pulses.   Edema:     Pretibial: bilateral trace pitting edema of the pretibial area.     Ankle: bilateral trace pitting edema of the ankle.  Skin:     General: Skin is warm and dry.   Neurological:      Mental Status: Alert, oriented to person, place, and time and oriented to person, place and time.      Gait: Gait abnormal.   Psychiatric:         Attention and Perception: Attention normal.         Mood and Affect: Affect is not blunt.         Speech: Speech normal.         Behavior: Behavior normal. Behavior is cooperative.         Cognition and Memory: Cognition normal.         Result Review  Data Reviewed:  The following data was reviewed by: LESLEY Stanford on 09/12/2024  Scan on 6/27/2024 1949 by Joseph Neri MD: MDT REMOTE DEVICE CHECK, [facility] 05-   Lab Results - Last 18 Months   Lab Units 08/02/24  1103 08/02/23  1210 06/05/23  1512 04/25/23  1208   BUN mg/dL 11  --   --   --    CREATININE mg/dL 1.0  --   --   --    SODIUM mmol/L 134*  --   --   --    POTASSIUM mmol/L 4.7  --   --   --    CHLORIDE mmol/L 100  --   --   --    CALCIUM mg/dL 8.5*  --   --   --    ALBUMIN g/dL 3.9  --   --   --    BILIRUBIN mg/dL 0.6  --   --   --    ALK PHOS U/L 118  --   --   --    AST (SGOT) U/L 20  --   --   --    ALT (SGPT) U/L 20  --   --   --    HEMOGLOBIN A1C %  --   --   --  5.8   WBC K/uL 9.28* 7.58 7.13  --    RBC M/uL 4.83 4.58* 4.44*  --    HEMATOCRIT % 41.9 41.6 44.2  --    MCV fL 86.7 90.8 99.5*  --    MCH pg 28.4 31.0 31.1  --    Cardiology Scan (05/28/2024)   Scan on 3/21/2024 1017 by Meredith Catalan APRN: CMP/LIPID/ [facility]/ 3-          ECG 12 Lead    Date/Time: 9/12/2024 1:45 PM  Performed by: Meredith Catalan APRN    Authorized by: Meredith Catalan APRN  Comparison: compared with previous ECG from 11/17/2023  Comparison to previous ECG: PVC couplet is new  Rhythm: sinus rhythm  Ectopy: couplets  Rate: normal  BPM: 84  Conduction comments:  Moderate IVCD  Other findings: non-specific ST-T wave changes    Clinical impression: abnormal EKG             Assessment and Plan   Problem List Items Addressed This Visit          Cardiac and Vasculature    CAD (coronary artery disease) (Chronic)    Overview     S/p CABG 2003         Current Assessment & Plan     Remains stable without angina but remains fairly sedentary. Encouraged healthy diet, gradual increase in activity. Added Jardiance. Continue aspirin - unknown when Eliquis stopped - had Left subclavian, axillary, and brachial thrombus and bilateral PEs with large saddle embolus R main pulmonary 11/2023 and Left ulnar thrombus 1/2024 - provoked by ICD gen change? Deferred duration of Eliquis to vascular (Dr Callie Otero) but might be beneficial to stay on low dose lifelong to prevent recurrence.          Relevant Orders    ECG 12 Lead    CHF (congestive heart failure) - Primary (Chronic)    Overview     EF 45% 2019 echo  EF 35-40%, mild LAE, mild MR, grade I DD, non-dilated, RVSP 31 mm Hg 11/2023         Current Assessment & Plan     Chronic systolic/diastolic. Remains Class II, Stage C. Continue carvedilol, Entresto, and Jardiance 10 mg daily. Will consider addition of aldosterone antagonist if BP allows at next visit. Previously advised him to wean off furosemide and use 20 mg daily PRN only but continues to take twice a day? Again encouraged him to try to cut back on alcohol intake which he is unwilling to do at this time.         Relevant Orders    ECG 12 Lead    HLD (hyperlipidemia) (Chronic)    Overview     3/2024 , , HDL 62, LDL 55         Current Assessment & Plan     Better controlled per 3/2024 labs.         HTN (hypertension) (Chronic)    Current Assessment & Plan     Remains controlled with change to Entresto and other HF medications. Continue present therapy. Call if any dizziness/weakness/hypotension.         Relevant Orders    ECG 12 Lead    ICD (implantable  cardioverter-defibrillator) in place (Chronic)    Overview     Medtronic gen change 11/1/23         Current Assessment & Plan     Normal function per 5/2024 remote interrogation. No reported shocks or alerts. Continue every 3 month remote and yearly in office.         Relevant Orders    ECG 12 Lead       Coag and Thromboembolic    History of pulmonary embolism    Current Assessment & Plan     No longer on Eliquis - may have been provoked by ICD gen change. Will discuss with vascular.            Mental Health    Alcohol abuse (Chronic)    Current Assessment & Plan     Again encouraged him to decrease alcohol intake to no more than 2 beers per day. He remains uninterested in cutting back and is aware of risks.            Tobacco    Tobacco abuse, in remission (Chronic)    Current Assessment & Plan     Encouraged continued cessation.          Advance Care Planning   ACP discussion was held with the patient during this visit. Patient does not have an advance directive, declines further assistance.       Patient was given instructions and counseling regarding his condition or for health maintenance advice. Please see specific information pulled into the AVS if appropriate.    Follow Up :   Return in about 6 months (around 3/12/2025) for Recheck.                  Meredith Catalan, APRN, ACNP-BC, CHFN-BC

## 2024-09-12 NOTE — ASSESSMENT & PLAN NOTE
Remains controlled with change to Entresto and other HF medications. Continue present therapy. Call if any dizziness/weakness/hypotension.

## 2024-09-13 NOTE — PROGRESS NOTES
Eliquis wasn't on his list - probably dropped off when it . Please check with him to see if taking and check with Dr. Otero to see what her recommendations were and copy of her most recent office note. TX!

## 2024-09-16 DIAGNOSIS — Z86.711 HISTORY OF PULMONARY EMBOLISM: ICD-10-CM

## 2024-09-16 DIAGNOSIS — Z86.718 HISTORY OF DVT (DEEP VEIN THROMBOSIS): ICD-10-CM

## 2024-09-16 DIAGNOSIS — I82.622 ARM DVT (DEEP VENOUS THROMBOEMBOLISM), ACUTE, LEFT: Primary | ICD-10-CM

## 2024-09-16 NOTE — PROGRESS NOTES
Looks like she wanted him to stay on Eliquis and wanted to see him again in July. Please notify him I've ordered Eliquis 2.5 mg that he needs to take twice a day to help prevent future blood clots and that he needs to schedule f/u with Dr. Callie Otero. TX!

## 2024-11-26 ENCOUNTER — CLINICAL SUPPORT NO REQUIREMENTS (OUTPATIENT)
Dept: CARDIOLOGY | Facility: CLINIC | Age: 74
End: 2024-11-26
Payer: OTHER GOVERNMENT

## 2024-11-26 DIAGNOSIS — I50.42 CHRONIC COMBINED SYSTOLIC AND DIASTOLIC CONGESTIVE HEART FAILURE: Chronic | ICD-10-CM

## 2024-11-26 DIAGNOSIS — Z95.810 ICD (IMPLANTABLE CARDIOVERTER-DEFIBRILLATOR) IN PLACE: Primary | Chronic | ICD-10-CM

## 2024-11-26 NOTE — PROGRESS NOTES
[employer] AICD Evaluation Report    November 26, 2024    Indication for AICD: Primary Prevention of Sudden Cardiac Death    Implanting MD: Dr. Otero [facility]    : [employer]onic  Model: Visia AF MRI [id number] SN: [id number]    Implant Date: gen change 11/1/23, RV lead 2/21/2014 Dr. Rodriguez    Reason For Evaluation: routine      DIAGNOSTIC DATA    Atrial paced: n/a% Ventricular paced: Less than 0.1%    Battery status: satisfactory  Voltage: 3.06 V  Estimated battery life: 10.3 years    12 episodes of non-sustained VT for one second each.    137 episodes of AF (0.3% burden) seems to be secondary to frequent PVCs (per device representative).    Optivol elevated since 11/15/24. He denies symptoms or any missed doses of medications.    FINAL PARAMETERS    Changes made: none    Conclusions: normal AICD function, stable pacing and sensing thresholds, adequate battery reserve, no shocks or ATP delivered, and elevated Optivol . Advised him to monitor closely for any s/s of exacerbation and to not miss any doses of medications.      Return in about 1 year (around 11/26/2025) for Pacemaker Clinic [employer]. Continue [facility] every 3 months.      Meredith Catalan APRN, ACNP-BC, CHFN-BC  
None

## 2025-03-12 ENCOUNTER — OFFICE VISIT (OUTPATIENT)
Dept: CARDIOLOGY | Facility: CLINIC | Age: 75
End: 2025-03-12
Payer: OTHER GOVERNMENT

## 2025-03-12 VITALS
DIASTOLIC BLOOD PRESSURE: 62 MMHG | HEIGHT: 67 IN | BODY MASS INDEX: 32.02 KG/M2 | HEART RATE: 68 BPM | SYSTOLIC BLOOD PRESSURE: 110 MMHG | OXYGEN SATURATION: 98 % | WEIGHT: 204 LBS

## 2025-03-12 DIAGNOSIS — I10 PRIMARY HYPERTENSION: ICD-10-CM

## 2025-03-12 DIAGNOSIS — E66.2 CLASS 1 OBESITY WITH ALVEOLAR HYPOVENTILATION WITHOUT SERIOUS COMORBIDITY WITH BODY MASS INDEX (BMI) OF 31.0 TO 31.9 IN ADULT: ICD-10-CM

## 2025-03-12 DIAGNOSIS — F10.10 ALCOHOL ABUSE: ICD-10-CM

## 2025-03-12 DIAGNOSIS — E66.811 CLASS 1 OBESITY WITH ALVEOLAR HYPOVENTILATION WITHOUT SERIOUS COMORBIDITY WITH BODY MASS INDEX (BMI) OF 31.0 TO 31.9 IN ADULT: ICD-10-CM

## 2025-03-12 DIAGNOSIS — E78.2 MIXED HYPERLIPIDEMIA: ICD-10-CM

## 2025-03-12 DIAGNOSIS — F17.201 TOBACCO ABUSE, IN REMISSION: ICD-10-CM

## 2025-03-12 DIAGNOSIS — I50.42 CHRONIC COMBINED SYSTOLIC AND DIASTOLIC CONGESTIVE HEART FAILURE: ICD-10-CM

## 2025-03-12 DIAGNOSIS — Z86.711 HISTORY OF PULMONARY EMBOLISM: ICD-10-CM

## 2025-03-12 DIAGNOSIS — I25.10 CORONARY ARTERY DISEASE INVOLVING NATIVE CORONARY ARTERY OF NATIVE HEART WITHOUT ANGINA PECTORIS: Primary | ICD-10-CM

## 2025-03-12 DIAGNOSIS — Z95.810 ICD (IMPLANTABLE CARDIOVERTER-DEFIBRILLATOR) IN PLACE: ICD-10-CM

## 2025-03-12 RX ORDER — SACUBITRIL AND VALSARTAN 49; 51 MG/1; MG/1
1 TABLET, FILM COATED ORAL 2 TIMES DAILY
Qty: 180 TABLET | Refills: 3 | Status: SHIPPED | OUTPATIENT
Start: 2025-03-12 | End: 2026-03-12

## 2025-03-12 NOTE — PROGRESS NOTES
Subjective:     Encounter Date: 3/12/2025      Patient ID: Guy Crawford is a 74 y.o. male with coronary artery disease s/p CABG 2003, Chronic combined congestive heart failure s/p ICD, hx of pulmonary embolism, hypertension, hyperlipidemia, alcohol abuse and former smoker.    Chief Complaint: no complaints  History of Present Illness  Patient presents today for management of coronary artery disease, congestive heart failure. Today he reports that he has been doing good. He reports that he feels like winter has been long and he is ready for spring. He denies any chest pain. He reports chronic dyspnea on exertion that is unchanged. He denies any routine exercise. He denies any heart racing or palpitations. He denies any leg swelling, orthopnea or PND. He reports some mild intermittent dizziness. He denies monitoring his BP. He reports that his BP has remained controlled at other dr visits. He reports that he continues to consume a 6 pack of alcohol a day. Patient is on Eliquis and denies any bleeding issues. Patient follows with Dr Smart as PCP.     The following portions of the patient's history were reviewed and updated as appropriate: allergies, current medications, past family history, past medical history, past social history, past surgical history and problem list.    Allergies   Allergen Reactions    Allopurinol Unknown - Low Severity    Atorvastatin Unknown - Low Severity    Rosuvastatin Unknown - Low Severity    Simvastatin Unknown - Low Severity       Current Outpatient Medications:     allopurinol (ZYLOPRIM) 300 MG tablet, Take 1 tablet by mouth Daily., Disp: , Rfl:     ALPRAZolam (XANAX) 0.5 MG tablet, Take 1 tablet by mouth 2 (Two) Times a Day As Needed for Anxiety. Takes every night, Disp: , Rfl:     aspirin 81 MG chewable tablet, Chew 1 tablet Daily., Disp: , Rfl:     atorvastatin (LIPITOR) 40 MG tablet, Take 1 tablet by mouth Every Night., Disp: 90 tablet, Rfl: 3    carvedilol (COREG) 25 MG tablet,  Take 0.5 tablets by mouth 2 (Two) Times a Day With Meals., Disp: , Rfl:     cholecalciferol (VITAMIN D3) 25 MCG (1000 UT) tablet, Take 1 tablet by mouth Daily., Disp: , Rfl:     diphenhydrAMINE-acetaminophen (TYLENOL PM)  MG tablet per tablet, Take 1 tablet by mouth At Night As Needed for Sleep., Disp: , Rfl:     empagliflozin (Jardiance) 10 MG tablet tablet, Take 1 tablet by mouth Daily., Disp: 90 tablet, Rfl: 3    Flaxseed, Linseed, (FLAX SEED OIL PO), Take  by mouth., Disp: , Rfl:     furosemide (LASIX) 20 MG tablet, Take 1 tablet by mouth Daily. [facility] fluid and [facility], Disp: , Rfl:     HYDROcodone-acetaminophen (NORCO)  MG per tablet, Take 1 tablet by mouth Every 6 (Six) Hours As Needed for Moderate Pain., Disp: , Rfl:     Loratadine 10 MG capsule, Take 1 capsule by mouth. CLARITIN, Disp: , Rfl:     melatonin 5 MG tablet tablet, Take 2 tablets by mouth At Night As Needed., Disp: , Rfl:     sacubitril-valsartan (ENTRESTO) 49-51 MG tablet, Take 1 tablet by mouth 2 (Two) Times a Day., Disp: 180 tablet, Rfl: 3    vitamin B-12 (CYANOCOBALAMIN) 1000 MCG tablet, Take 1 tablet by mouth Daily., Disp: , Rfl:   Past Medical History:   Diagnosis Date    Alcohol dependence     Anxiety     Arm DVT (deep venous thromboembolism), acute, left 11/2023    CAD (coronary artery disease)     Cardiomyopathy     CHF (congestive heart failure)     GERD (gastroesophageal reflux disease)     Gout     Hard to intubate     [facility] D blade    Hearing loss     HLD (hyperlipidemia)     HTN (hypertension)     Insomnia     Ischemic heart disease     Pacemaker     Pulmonary embolism 11/2023    Renal cell carcinoma     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    Tachycardia      Social History     Socioeconomic History    Marital status:    Tobacco Use    Smoking status: Former     Current packs/day: 0.00     Average packs/day: 1 pack/day for 42.0 years (42.0 ttl pk-yrs)     Types: Cigarettes     Start date: 1970      Quit date: 2011     Years since quittin.2     Passive exposure: Past (father smoked in home and car when he was a child and in  for 2 years)    Smokeless tobacco: Never    Tobacco comments:     ONLY SMOKED AND DRANK ALCOHOL ON THE WEEKEND for years   Vaping Use    Vaping status: Never Used   Substance and Sexual Activity    Alcohol use: Yes     Alcohol/week: 42.0 standard drinks of alcohol     Types: 42 Cans of beer per week     Comment: 6 PACK A DAY since around 2019 - hasn't tried cutting back and not interested in cutting back    Drug use: Never    Sexual activity: Defer       Review of Systems   Constitutional: Negative for malaise/fatigue.   Cardiovascular:  Positive for dyspnea on exertion (chronic and unchanged). Negative for chest pain, irregular heartbeat, leg swelling, near-syncope, orthopnea, palpitations, paroxysmal nocturnal dyspnea and syncope.   Hematologic/Lymphatic: Bruises/bleeds easily.   Genitourinary:  Negative for hematuria.   Neurological:  Positive for dizziness (intermittent). Negative for weakness.   All other systems reviewed and are negative.         Objective:     Vitals reviewed.   Constitutional:       General: Not in acute distress.     Appearance: Normal appearance. Well-developed.   Eyes:      Pupils: Pupils are equal, round, and reactive to light.   HENT:      Head: Normocephalic and atraumatic.      Nose: Nose normal.   Neck:      Vascular: No carotid bruit.   Pulmonary:      Effort: Pulmonary effort is normal. No respiratory distress.      Breath sounds: Normal breath sounds. No wheezing. No rales.   Cardiovascular:      Normal rate. Regular rhythm.      Murmurs: There is no murmur.   Edema:     Peripheral edema absent.   Abdominal:      General: There is no distension.      Palpations: Abdomen is soft.   Musculoskeletal: Normal range of motion.      Cervical back: Normal range of motion and neck supple. Skin:     General: Skin is warm.      Findings: No  "erythema or rash.   Neurological:      General: No focal deficit present.      Mental Status: Alert and oriented to person, place, and time.   Psychiatric:         Attention and Perception: Attention normal.         Mood and Affect: Mood normal.         Speech: Speech normal.         Behavior: Behavior normal.         Thought Content: Thought content normal.         Judgment: Judgment normal.         /62 (BP Location: Left arm, Patient Position: Sitting, Cuff Size: Adult)   Pulse 68   Ht 170.2 cm (67.01\")   Wt 92.5 kg (204 lb)   SpO2 98%   BMI 31.94 kg/m²     Procedures    Lab Review:       Echo 11/2023        Lipid panel 3/2024:      I have personally reviewed echo, labs, ICD interrogation and past office notes prior to patients visit  Assessment:          Diagnosis Plan   1. Coronary artery disease involving native coronary artery of native heart without angina pectoris        2. Chronic combined systolic and diastolic congestive heart failure [I50.42]        3. ICD (implantable cardioverter-defibrillator) in place [Z95.810]        4. Primary hypertension        5. Mixed hyperlipidemia        6. History of pulmonary embolism        7. Alcohol abuse        8. Tobacco abuse, in remission        9. Class 1 obesity with alveolar hypoventilation without serious comorbidity with body mass index (BMI) of 31.0 to 31.9 in adult               Plan:       Coronary artery disease: s/p CABG 2003. Patient had Left subclavian, axillary, and brachial thrombus and bilateral PEs with large saddle embolus R main pulmonary 11/2023 and Left ulnar thrombus 1/2024. Possibly provoked by ICD gen change? Decision of eliquis was deferred to vascular; patient reports that he took it until he was out of it. He reports that he has been taking aspirin. Continue aspirin, and carvedilol, atorvastatin, and jardiance    2. Chronic combined congestive heart failure: LVEF 45 2019, LVEF 35-40% 11/2023. S/p ICD in place. NYHA class II stage " C. Continue carvedilol, Entresto and Jardiance. He is unsure if he has been on Entresto or if he has run out of it. Will refill it today. Continue lasix 20 mg daily. Reviewed signs and symptoms of CHF and what to report to office with patient. Patient instructed to restrict sodium and record daily weight. Patient is to report weight gain of greater than 2 lbs overnight or 5 lbs in 1 week. Patient verbalized understanding of instructions and plan of care.     3. ICD: Interrogation 2/26/2025 demonstrated normal device function. Battery 10 yr 2 month. Rvp: 0.01% <0.2% of AT/AF burden. Longest episode 56 minutes. Also 2 NS-VT episodes detected noted to be 2 seconds in length on 2/25/2025.     4. Hypertension: Controlled. Continue current medications.     5. Hyperlipidemia: Lipid panel 3/2024 demonstrated well controlled cholesterol. LDL 55. Continue atorvastatin.     6.Hx of pulmonary embolism    7.Alcohol abuse: discussed decreasing daily alcohol intake. Patient is not interested at this time    8. Former smoker: Guy Crawford  reports that he quit smoking about 13 years ago. His smoking use included cigarettes. He started smoking about 55 years ago. He has a 42 pack-year smoking history. He has been exposed to tobacco smoke. He has never used smokeless tobacco. I have educated him on the risk of diseases from using tobacco products such as cancer, COPD, and heart disease.    9. Obesity:   BMI is >= 30 and <35. (Class 1 Obesity). The following options were offered after discussion;: exercise counseling/recommendations and nutrition counseling/recommendations    I spent 36 minutes caring for Guy on this date of service. This time includes time spent by me in the following activities:preparing for the visit, reviewing tests, obtaining and/or reviewing a separately obtained history, performing a medically appropriate examination and/or evaluation , counseling and educating the patient/family/caregiver, ordering  medications, tests, or procedures, and documenting information in the medical record     Patient is to follow up in 6 months or sooner if needed

## 2025-06-12 ENCOUNTER — TELEPHONE (OUTPATIENT)
Dept: CARDIOLOGY | Facility: CLINIC | Age: 75
End: 2025-06-12
Payer: OTHER GOVERNMENT

## 2025-06-12 NOTE — TELEPHONE ENCOUNTER
Patient notified that they need to send a transmission using their home monitor for their implanted cardiac device.  Contact information for [employer] provided for difficulty transmitting or for any error codes encountered when using monitor.     [employer]  [phone]

## 2025-06-16 ENCOUNTER — TELEPHONE (OUTPATIENT)
Dept: CARDIOLOGY | Facility: CLINIC | Age: 75
End: 2025-06-16
Payer: OTHER GOVERNMENT

## 2025-06-16 NOTE — TELEPHONE ENCOUNTER
Patient notified that they need to send a transmission using their home monitor for their implanted cardiac device.  Pt states that he still has the number to "[employer]" support and will send a report today.

## 2025-08-05 ENCOUNTER — TELEPHONE (OUTPATIENT)
Dept: HEMATOLOGY | Age: 75
End: 2025-08-05

## 2025-08-07 DIAGNOSIS — C64.2 CLEAR CELL CARCINOMA OF LEFT KIDNEY (HCC): ICD-10-CM

## 2025-08-07 DIAGNOSIS — R76.8 ELEVATED SERUM IMMUNOGLOBULIN FREE LIGHT CHAINS: Primary | ICD-10-CM

## 2025-08-28 LAB
MC_CV_MDC_IDC_RATE_1: 133
MC_CV_MDC_IDC_RATE_1: 167
MC_CV_MDC_IDC_RATE_1: 188
MC_CV_MDC_IDC_RATE_1: 188
MC_CV_MDC_IDC_RATE_2: 167
MC_CV_MDC_IDC_RATE_2: 240
MC_CV_MDC_IDC_RATE_2: 240
MC_CV_MDC_IDC_RV_HV_IMPEDANCE: 54
MC_CV_MDC_IDC_SVC_MEASURED_IMPEDANCE: 61
MC_CV_MDC_IDC_THERAPIES: NORMAL
MC_CV_MDC_IDC_ZONE_ID: 3
MC_CV_MDC_IDC_ZONE_ID: 4
MC_CV_MDC_IDC_ZONE_ID: 5
MC_CV_MDC_IDC_ZONE_ID: 6
MDC_IDC_MSMT_BATTERY_REMAINING_LONGEVITY: 118 MO
MDC_IDC_MSMT_BATTERY_RRT_TRIGGER: 2.73
MDC_IDC_MSMT_BATTERY_STATUS: NORMAL
MDC_IDC_MSMT_BATTERY_VOLTAGE: 3.02
MDC_IDC_MSMT_CAP_CHARGE_TIME: 3.71
MDC_IDC_MSMT_LEADCHNL_RV_DTM: NORMAL
MDC_IDC_MSMT_LEADCHNL_RV_IMPEDANCE_VALUE: 437
MDC_IDC_MSMT_LEADCHNL_RV_PACING_THRESHOLD_AMPLITUDE: 0.62
MDC_IDC_MSMT_LEADCHNL_RV_PACING_THRESHOLD_POLARITY: NORMAL
MDC_IDC_MSMT_LEADCHNL_RV_PACING_THRESHOLD_PULSEWIDTH: 0.4
MDC_IDC_MSMT_LEADCHNL_RV_SENSING_INTR_AMPL: 12.25
MDC_IDC_PG_IMPLANT_DTM: NORMAL
MDC_IDC_PG_MFG: NORMAL
MDC_IDC_PG_MODEL: NORMAL
MDC_IDC_PG_SERIAL: NORMAL
MDC_IDC_PG_TYPE: NORMAL
MDC_IDC_SESS_DTM: NORMAL
MDC_IDC_SESS_TYPE: NORMAL
MDC_IDC_SET_BRADY_LOWRATE: 40
MDC_IDC_SET_BRADY_MODE: NORMAL
MDC_IDC_SET_LEADCHNL_RV_PACING_AMPLITUDE: 2
MDC_IDC_SET_LEADCHNL_RV_PACING_POLARITY: NORMAL
MDC_IDC_SET_LEADCHNL_RV_PACING_PULSEWIDTH: 0.4
MDC_IDC_SET_LEADCHNL_RV_SENSING_POLARITY: NORMAL
MDC_IDC_SET_LEADCHNL_RV_SENSING_SENSITIVITY: 0.3
MDC_IDC_SET_ZONE_STATUS: NORMAL
MDC_IDC_SET_ZONE_TYPE: NORMAL
MDC_IDC_STAT_AT_BURDEN_PERCENT: 0.9
MDC_IDC_STAT_BRADY_RV_PERCENT_PACED: 0.02
MDC_IDC_STAT_TACHYTHERAPY_ATP_DELIVERED_RECENT: 0
MDC_IDC_STAT_TACHYTHERAPY_SHOCKS_ABORTED_RECENT: 0
MDC_IDC_STAT_TACHYTHERAPY_SHOCKS_DELIVERED_RECENT: 0

## (undated) DEVICE — APPL CHLORAPREP W/TINT 26ML ORNG

## (undated) DEVICE — ENDOPATH PNEUMONEEDLE INSUFFLATION NEEDLES WITH LUER LOCK CONNECTORS 120MM: Brand: ENDOPATH

## (undated) DEVICE — SUT VIC 0 UR6 27IN VCP603H

## (undated) DEVICE — 2, DISPOSABLE SUCTION/IRRIGATOR WITHOUT DISPOSABLE TIP: Brand: STRYKEFLOW

## (undated) DEVICE — LAPAROSCOPIC MONOPOLAR CORD: Brand: VALLEYLAB

## (undated) DEVICE — ENDOPATH XCEL WITH OPTIVIEW TECHNOLOGY UNIVERSAL TROCAR STABILITY SLEEVES: Brand: ENDOPATH XCEL OPTIVIEW

## (undated) DEVICE — RESERVOIR,SUCTION,100CC,SILICONE: Brand: MEDLINE

## (undated) DEVICE — ANTIBACTERIAL UNDYED BRAIDED (POLYGLACTIN 910), SYNTHETIC ABSORBABLE SUTURE: Brand: COATED VICRYL

## (undated) DEVICE — GLV SURG BIOGEL M LTX PF 7 1/2

## (undated) DEVICE — PAD LAP CHOLE: Brand: MEDLINE INDUSTRIES, INC.

## (undated) DEVICE — TOWEL,OR,DSP,ST,BLUE,STD,4/PK,20PK/CS: Brand: MEDLINE

## (undated) DEVICE — ENDOPOUCH RETRIEVER SPECIMEN RETRIEVAL BAGS: Brand: ENDOPOUCH RETRIEVER

## (undated) DEVICE — ENDOPATH XCEL WITH OPTIVIEW TECHNOLOGY DILATING TIP TROCARS WITH STABILITY SLEEVES: Brand: ENDOPATH XCEL OPTIVIEW

## (undated) DEVICE — MONOPOLAR METZENBAUM SCISSOR, MINI BLADE TIP, DISPOSABLE: Brand: MONOPOLAR METZENBAUM SCISSOR, MINI BLADE TIP, DISPOSABLE

## (undated) DEVICE — EXCEL 10FT (3.05 M) INSUFFLATION TUBING SET WITH 0.1 MICRON FILTER: Brand: EXCEL

## (undated) DEVICE — ENDOPATH XCEL DILATING TIP TROCARS WITH STABILITY SLEEVES: Brand: ENDOPATH XCEL

## (undated) DEVICE — 3M™ STERI-STRIP™ REINFORCED ADHESIVE SKIN CLOSURES, R1546, 1/4 IN X 4 IN (6 MM X 100 MM), 10 STRIPS/ENVELOPE: Brand: 3M™ STERI-STRIP™

## (undated) DEVICE — DRN JP RND NO TROC SIL 15F 3/16IN

## (undated) DEVICE — PDS II VLT 0 107CM AG ST3: Brand: ENDOLOOP

## (undated) DEVICE — PK TURNOVER RM ADV